# Patient Record
Sex: FEMALE | Race: OTHER | HISPANIC OR LATINO
[De-identification: names, ages, dates, MRNs, and addresses within clinical notes are randomized per-mention and may not be internally consistent; named-entity substitution may affect disease eponyms.]

---

## 2015-05-20 RX ORDER — METOPROLOL TARTRATE 50 MG
1 TABLET ORAL
Qty: 0 | Refills: 0 | DISCHARGE
Start: 2015-05-20

## 2017-03-05 ENCOUNTER — RX RENEWAL (OUTPATIENT)
Age: 42
End: 2017-03-05

## 2017-04-28 ENCOUNTER — LABORATORY RESULT (OUTPATIENT)
Age: 42
End: 2017-04-28

## 2017-04-28 ENCOUNTER — OUTPATIENT (OUTPATIENT)
Dept: OUTPATIENT SERVICES | Facility: HOSPITAL | Age: 42
LOS: 1 days | End: 2017-04-28
Payer: SELF-PAY

## 2017-04-28 ENCOUNTER — APPOINTMENT (OUTPATIENT)
Dept: INTERNAL MEDICINE | Facility: CLINIC | Age: 42
End: 2017-04-28

## 2017-04-28 VITALS
BODY MASS INDEX: 39.27 KG/M2 | WEIGHT: 208 LBS | HEART RATE: 103 BPM | DIASTOLIC BLOOD PRESSURE: 90 MMHG | SYSTOLIC BLOOD PRESSURE: 140 MMHG | HEIGHT: 61 IN

## 2017-04-28 VITALS — DIASTOLIC BLOOD PRESSURE: 80 MMHG | SYSTOLIC BLOOD PRESSURE: 130 MMHG

## 2017-04-28 DIAGNOSIS — I10 ESSENTIAL (PRIMARY) HYPERTENSION: ICD-10-CM

## 2017-04-28 DIAGNOSIS — Z00.00 ENCOUNTER FOR GENERAL ADULT MEDICAL EXAMINATION WITHOUT ABNORMAL FINDINGS: ICD-10-CM

## 2017-04-28 PROCEDURE — 85027 COMPLETE CBC AUTOMATED: CPT

## 2017-04-28 PROCEDURE — G0463: CPT

## 2017-04-28 PROCEDURE — 80061 LIPID PANEL: CPT

## 2017-04-28 PROCEDURE — 84439 ASSAY OF FREE THYROXINE: CPT

## 2017-04-28 PROCEDURE — 84443 ASSAY THYROID STIM HORMONE: CPT

## 2017-04-28 PROCEDURE — 83036 HEMOGLOBIN GLYCOSYLATED A1C: CPT

## 2017-04-28 PROCEDURE — 80053 COMPREHEN METABOLIC PANEL: CPT

## 2017-04-29 LAB
ALBUMIN SERPL ELPH-MCNC: 4.3 G/DL — SIGNIFICANT CHANGE UP (ref 3.3–5)
ALP SERPL-CCNC: 80 U/L — SIGNIFICANT CHANGE UP (ref 40–120)
ALT FLD-CCNC: 28 U/L — SIGNIFICANT CHANGE UP (ref 10–45)
ANION GAP SERPL CALC-SCNC: 18 MMOL/L — HIGH (ref 5–17)
AST SERPL-CCNC: 26 U/L — SIGNIFICANT CHANGE UP (ref 10–40)
BILIRUB SERPL-MCNC: 0.2 MG/DL — SIGNIFICANT CHANGE UP (ref 0.2–1.2)
BUN SERPL-MCNC: 16 MG/DL — SIGNIFICANT CHANGE UP (ref 7–23)
CALCIUM SERPL-MCNC: 9.2 MG/DL — SIGNIFICANT CHANGE UP (ref 8.4–10.5)
CHLORIDE SERPL-SCNC: 101 MMOL/L — SIGNIFICANT CHANGE UP (ref 96–108)
CHOLEST SERPL-MCNC: 211 MG/DL — HIGH (ref 10–199)
CO2 SERPL-SCNC: 21 MMOL/L — LOW (ref 22–31)
CREAT SERPL-MCNC: 0.78 MG/DL — SIGNIFICANT CHANGE UP (ref 0.5–1.3)
GLUCOSE SERPL-MCNC: 93 MG/DL — SIGNIFICANT CHANGE UP (ref 70–99)
HBA1C BLD-MCNC: 6 % — HIGH (ref 4–5.6)
HCT VFR BLD CALC: 40.1 % — SIGNIFICANT CHANGE UP (ref 34.5–45)
HDLC SERPL-MCNC: 48 MG/DL — SIGNIFICANT CHANGE UP (ref 40–125)
HGB BLD-MCNC: 13.2 G/DL — SIGNIFICANT CHANGE UP (ref 11.5–15.5)
LIPID PNL WITH DIRECT LDL SERPL: 99 MG/DL — SIGNIFICANT CHANGE UP
MCHC RBC-ENTMCNC: 27.7 PG — SIGNIFICANT CHANGE UP (ref 27–34)
MCHC RBC-ENTMCNC: 32.9 GM/DL — SIGNIFICANT CHANGE UP (ref 32–36)
MCV RBC AUTO: 84.2 FL — SIGNIFICANT CHANGE UP (ref 80–100)
PLATELET # BLD AUTO: 286 K/UL — SIGNIFICANT CHANGE UP (ref 150–400)
POTASSIUM SERPL-MCNC: 4 MMOL/L — SIGNIFICANT CHANGE UP (ref 3.5–5.3)
POTASSIUM SERPL-SCNC: 4 MMOL/L — SIGNIFICANT CHANGE UP (ref 3.5–5.3)
PROT SERPL-MCNC: 7.9 G/DL — SIGNIFICANT CHANGE UP (ref 6–8.3)
RBC # BLD: 4.76 M/UL — SIGNIFICANT CHANGE UP (ref 3.8–5.2)
RBC # FLD: 13.7 % — SIGNIFICANT CHANGE UP (ref 10.3–14.5)
SODIUM SERPL-SCNC: 140 MMOL/L — SIGNIFICANT CHANGE UP (ref 135–145)
TOTAL CHOLESTEROL/HDL RATIO MEASUREMENT: 4.4 RATIO — SIGNIFICANT CHANGE UP (ref 3.3–7.1)
TRIGL SERPL-MCNC: 319 MG/DL — HIGH (ref 10–149)
TSH SERPL-MCNC: 4.52 UIU/ML — HIGH (ref 0.27–4.2)
WBC # BLD: 9.99 K/UL — SIGNIFICANT CHANGE UP (ref 3.8–10.5)
WBC # FLD AUTO: 9.99 K/UL — SIGNIFICANT CHANGE UP (ref 3.8–10.5)

## 2017-05-02 LAB — T4 FREE SERPL-MCNC: 1.1 NG/DL — SIGNIFICANT CHANGE UP (ref 0.9–1.8)

## 2017-05-09 DIAGNOSIS — E66.01 MORBID (SEVERE) OBESITY DUE TO EXCESS CALORIES: ICD-10-CM

## 2017-07-12 ENCOUNTER — RX RENEWAL (OUTPATIENT)
Age: 42
End: 2017-07-12

## 2017-10-06 ENCOUNTER — MEDICATION RENEWAL (OUTPATIENT)
Age: 42
End: 2017-10-06

## 2017-10-06 ENCOUNTER — APPOINTMENT (OUTPATIENT)
Dept: PEDIATRIC ALLERGY IMMUNOLOGY | Facility: CLINIC | Age: 42
End: 2017-10-06

## 2017-10-09 ENCOUNTER — APPOINTMENT (OUTPATIENT)
Dept: INTERNAL MEDICINE | Facility: CLINIC | Age: 42
End: 2017-10-09

## 2018-02-28 ENCOUNTER — RX RENEWAL (OUTPATIENT)
Age: 43
End: 2018-02-28

## 2018-06-25 ENCOUNTER — APPOINTMENT (OUTPATIENT)
Dept: INTERNAL MEDICINE | Facility: CLINIC | Age: 43
End: 2018-06-25

## 2018-07-05 ENCOUNTER — RX RENEWAL (OUTPATIENT)
Age: 43
End: 2018-07-05

## 2018-07-30 ENCOUNTER — OUTPATIENT (OUTPATIENT)
Dept: OUTPATIENT SERVICES | Facility: HOSPITAL | Age: 43
LOS: 1 days | End: 2018-07-30
Payer: SELF-PAY

## 2018-07-30 ENCOUNTER — APPOINTMENT (OUTPATIENT)
Dept: INTERNAL MEDICINE | Facility: CLINIC | Age: 43
End: 2018-07-30

## 2018-07-30 VITALS
HEART RATE: 106 BPM | DIASTOLIC BLOOD PRESSURE: 100 MMHG | HEIGHT: 61 IN | WEIGHT: 211 LBS | BODY MASS INDEX: 39.84 KG/M2 | SYSTOLIC BLOOD PRESSURE: 160 MMHG

## 2018-07-30 DIAGNOSIS — I10 ESSENTIAL (PRIMARY) HYPERTENSION: ICD-10-CM

## 2018-07-30 PROCEDURE — G0463: CPT

## 2018-07-30 NOTE — REVIEW OF SYSTEMS
[Fever] : no fever [Chills] : no chills [Chest Pain] : no chest pain [Palpitations] : no palpitations [Orthopnea] : no orthopnea [Shortness Of Breath] : no shortness of breath [Wheezing] : no wheezing [Cough] : no cough [Abdominal Pain] : no abdominal pain [Nausea] : no nausea [Dysuria] : no dysuria [Incontinence] : no incontinence [Frequency] : no frequency [Headache] : no headache [Dizziness] : no dizziness

## 2018-07-30 NOTE — PHYSICAL EXAM
[No Acute Distress] : no acute distress [Well Developed] : well developed [Well-Appearing] : well-appearing [No Respiratory Distress] : no respiratory distress  [Clear to Auscultation] : lungs were clear to auscultation bilaterally [No Accessory Muscle Use] : no accessory muscle use [Regular Rhythm] : with a regular rhythm [Normal S1, S2] : normal S1 and S2 [No Murmur] : no murmur heard [Soft] : abdomen soft [Non Tender] : non-tender [Normal Bowel Sounds] : normal bowel sounds [No Focal Deficits] : no focal deficits

## 2018-07-30 NOTE — ASSESSMENT
[FreeTextEntry1] : 44 yo F with pmhx of poorly controlled HTN presents for renewal of HTN medications \par \par Plan:\par #Poorly controlled HTN \par -/100; currently not on medications \par -Meds renewed. \par -Scheduled for CPE

## 2018-07-30 NOTE — HISTORY OF PRESENT ILLNESS
[FreeTextEntry1] : I ran out of blood pressure medications  [de-identified] : 44 yo F with Pmhx of pre-DM and poorly controlled HTN presents to the clinic for renewal of HTN medications. The pt reports that she ran out of her meds two weeks ago. However, she is feeling well with no acute complaints. She endorses intermittent numbness in her arms that resolves spontaneously. She denies any fever, chills, nausea, vomiting, CP, palpitations, SOB, abdominal pain, changes in BM or urinary habits, or LE edema. \par

## 2018-08-02 DIAGNOSIS — R20.2 PARESTHESIA OF SKIN: ICD-10-CM

## 2018-08-06 ENCOUNTER — APPOINTMENT (OUTPATIENT)
Dept: INTERNAL MEDICINE | Facility: CLINIC | Age: 43
End: 2018-08-06

## 2018-08-06 ENCOUNTER — OUTPATIENT (OUTPATIENT)
Dept: OUTPATIENT SERVICES | Facility: HOSPITAL | Age: 43
LOS: 1 days | End: 2018-08-06
Payer: SELF-PAY

## 2018-08-06 VITALS
BODY MASS INDEX: 40.06 KG/M2 | RESPIRATION RATE: 98 BRPM | HEART RATE: 68 BPM | SYSTOLIC BLOOD PRESSURE: 130 MMHG | WEIGHT: 212 LBS | DIASTOLIC BLOOD PRESSURE: 90 MMHG

## 2018-08-06 DIAGNOSIS — I10 ESSENTIAL (PRIMARY) HYPERTENSION: ICD-10-CM

## 2018-08-06 PROCEDURE — G0463: CPT

## 2018-08-06 NOTE — PHYSICAL EXAM
[No Acute Distress] : no acute distress [PERRL] : pupils equal round and reactive to light [EOMI] : extraocular movements intact [Normal Oropharynx] : the oropharynx was normal [Normal TMs] : both tympanic membranes were normal [No Lymphadenopathy] : no lymphadenopathy [Clear to Auscultation] : lungs were clear to auscultation bilaterally [No Accessory Muscle Use] : no accessory muscle use [Normal Rate] : normal rate  [Regular Rhythm] : with a regular rhythm [Normal S1, S2] : normal S1 and S2 [No Murmur] : no murmur heard [No Edema] : there was no peripheral edema [Normal Appearance] : normal in appearance [No Masses] : no palpable masses [No Nipple Discharge] : no nipple discharge [No Axillary Lymphadenopathy] : no axillary lymphadenopathy [Soft] : abdomen soft [Non Tender] : non-tender [No HSM] : no HSM [Normal Bowel Sounds] : normal bowel sounds [Normal Posterior Cervical Nodes] : no posterior cervical lymphadenopathy [Normal Anterior Cervical Nodes] : no anterior cervical lymphadenopathy [Normal Affect] : the affect was normal [Alert and Oriented x3] : oriented to person, place, and time

## 2018-08-08 LAB
ANION GAP SERPL CALC-SCNC: 12 MMOL/L
BASOPHILS # BLD AUTO: 0.02 K/UL
BASOPHILS NFR BLD AUTO: 0.2 %
BUN SERPL-MCNC: 13 MG/DL
CALCIUM SERPL-MCNC: 9.7 MG/DL
CHLORIDE SERPL-SCNC: 103 MMOL/L
CO2 SERPL-SCNC: 25 MMOL/L
CREAT SERPL-MCNC: 0.75 MG/DL
EOSINOPHIL # BLD AUTO: 0.23 K/UL
EOSINOPHIL NFR BLD AUTO: 2.2 %
GLUCOSE SERPL-MCNC: 104 MG/DL
HBA1C MFR BLD HPLC: 6.1 %
HCT VFR BLD CALC: 39.1 %
HGB BLD-MCNC: 12.6 G/DL
IMM GRANULOCYTES NFR BLD AUTO: 0.6 %
LYMPHOCYTES # BLD AUTO: 3.17 K/UL
LYMPHOCYTES NFR BLD AUTO: 29.8 %
MAN DIFF?: NORMAL
MCHC RBC-ENTMCNC: 26.9 PG
MCHC RBC-ENTMCNC: 32.2 GM/DL
MCV RBC AUTO: 83.5 FL
MONOCYTES # BLD AUTO: 0.62 K/UL
MONOCYTES NFR BLD AUTO: 5.8 %
NEUTROPHILS # BLD AUTO: 6.53 K/UL
NEUTROPHILS NFR BLD AUTO: 61.4 %
PLATELET # BLD AUTO: 310 K/UL
POTASSIUM SERPL-SCNC: 4.8 MMOL/L
RBC # BLD: 4.68 M/UL
RBC # FLD: 14 %
SODIUM SERPL-SCNC: 140 MMOL/L
TSH SERPL-ACNC: 3.86 UIU/ML
WBC # FLD AUTO: 10.63 K/UL

## 2018-08-16 NOTE — REVIEW OF SYSTEMS
[Fever] : no fever [Chills] : no chills [Fatigue] : no fatigue [Vision Problems] : no vision problems [Chest Pain] : no chest pain [Palpitations] : no palpitations [Shortness Of Breath] : no shortness of breath [Abdominal Pain] : no abdominal pain [Nausea] : no nausea [Constipation] : no constipation [Vomiting] : no vomiting [Melena] : no melena [Dysuria] : no dysuria [Joint Pain] : no joint pain [Skin Rash] : no skin rash [Depression] : no depression

## 2018-08-16 NOTE — ASSESSMENT
[FreeTextEntry1] : Patient is a 44 y/o F with a PMH significant for HTN who presents today for a blood pressure check and CPE.\par \par 1) HCM\par - mammogram referral given\par - due for PAP in 2019\par - CBC, CMP, A1c, TSH, and liid panel ordered\par \par \par 2) Morbid Obesity\par - educated extensively on the importance of lifestyle modifications\par - recommended walking 30 minutes 3 times a week to start for exercise\par - counseled on the importance of minimizing carbohydrate intake \par - would like to try to lose weight on her own before being referred to obesity clinic\par \par \par 3) HTN\par - blood pressure better controlled today\par - continue lisinopril and metoprolol \par \par \par RTC as needed or in 1 year for CPE

## 2018-08-17 DIAGNOSIS — E66.01 MORBID (SEVERE) OBESITY DUE TO EXCESS CALORIES: ICD-10-CM

## 2018-08-17 DIAGNOSIS — E78.5 HYPERLIPIDEMIA, UNSPECIFIED: ICD-10-CM

## 2018-08-19 ENCOUNTER — FORM ENCOUNTER (OUTPATIENT)
Age: 43
End: 2018-08-19

## 2018-08-20 ENCOUNTER — APPOINTMENT (OUTPATIENT)
Dept: MAMMOGRAPHY | Facility: IMAGING CENTER | Age: 43
End: 2018-08-20
Payer: COMMERCIAL

## 2018-08-20 ENCOUNTER — OUTPATIENT (OUTPATIENT)
Dept: OUTPATIENT SERVICES | Facility: HOSPITAL | Age: 43
LOS: 1 days | End: 2018-08-20
Payer: SELF-PAY

## 2018-08-20 DIAGNOSIS — E66.01 MORBID (SEVERE) OBESITY DUE TO EXCESS CALORIES: ICD-10-CM

## 2018-08-20 DIAGNOSIS — E78.5 HYPERLIPIDEMIA, UNSPECIFIED: ICD-10-CM

## 2018-08-20 PROCEDURE — 77067 SCR MAMMO BI INCL CAD: CPT | Mod: 26

## 2018-08-20 PROCEDURE — 77067 SCR MAMMO BI INCL CAD: CPT

## 2018-08-20 PROCEDURE — 77063 BREAST TOMOSYNTHESIS BI: CPT | Mod: 26

## 2018-08-20 PROCEDURE — 77063 BREAST TOMOSYNTHESIS BI: CPT

## 2018-10-05 ENCOUNTER — RX RENEWAL (OUTPATIENT)
Age: 43
End: 2018-10-05

## 2018-12-28 ENCOUNTER — EMERGENCY (EMERGENCY)
Facility: HOSPITAL | Age: 43
LOS: 1 days | End: 2018-12-28
Attending: EMERGENCY MEDICINE
Payer: MEDICAID

## 2018-12-28 VITALS
TEMPERATURE: 100 F | DIASTOLIC BLOOD PRESSURE: 102 MMHG | HEART RATE: 140 BPM | OXYGEN SATURATION: 97 % | RESPIRATION RATE: 20 BRPM | SYSTOLIC BLOOD PRESSURE: 159 MMHG

## 2018-12-28 LAB
ALBUMIN SERPL ELPH-MCNC: 4.4 G/DL — SIGNIFICANT CHANGE UP (ref 3.3–5)
ALP SERPL-CCNC: 128 U/L — HIGH (ref 40–120)
ALT FLD-CCNC: 20 U/L — SIGNIFICANT CHANGE UP (ref 10–45)
ANION GAP SERPL CALC-SCNC: 17 MMOL/L — SIGNIFICANT CHANGE UP (ref 5–17)
APPEARANCE UR: CLEAR — SIGNIFICANT CHANGE UP
AST SERPL-CCNC: 21 U/L — SIGNIFICANT CHANGE UP (ref 10–40)
BASOPHILS # BLD AUTO: 0 K/UL — SIGNIFICANT CHANGE UP (ref 0–0.2)
BASOPHILS NFR BLD AUTO: 0.1 % — SIGNIFICANT CHANGE UP (ref 0–2)
BILIRUB SERPL-MCNC: 0.3 MG/DL — SIGNIFICANT CHANGE UP (ref 0.2–1.2)
BILIRUB UR-MCNC: NEGATIVE — SIGNIFICANT CHANGE UP
BUN SERPL-MCNC: 16 MG/DL — SIGNIFICANT CHANGE UP (ref 7–23)
CALCIUM SERPL-MCNC: 8.9 MG/DL — SIGNIFICANT CHANGE UP (ref 8.4–10.5)
CHLORIDE SERPL-SCNC: 100 MMOL/L — SIGNIFICANT CHANGE UP (ref 96–108)
CO2 SERPL-SCNC: 18 MMOL/L — LOW (ref 22–31)
COLOR SPEC: SIGNIFICANT CHANGE UP
CREAT SERPL-MCNC: 0.69 MG/DL — SIGNIFICANT CHANGE UP (ref 0.5–1.3)
DIFF PNL FLD: NEGATIVE — SIGNIFICANT CHANGE UP
EOSINOPHIL # BLD AUTO: 0 K/UL — SIGNIFICANT CHANGE UP (ref 0–0.5)
EOSINOPHIL NFR BLD AUTO: 0.3 % — SIGNIFICANT CHANGE UP (ref 0–6)
GAS PNL BLDV: SIGNIFICANT CHANGE UP
GLUCOSE SERPL-MCNC: 102 MG/DL — HIGH (ref 70–99)
GLUCOSE UR QL: NEGATIVE — SIGNIFICANT CHANGE UP
HCG SERPL-ACNC: <2 MIU/ML — SIGNIFICANT CHANGE UP
HCT VFR BLD CALC: 35.8 % — SIGNIFICANT CHANGE UP (ref 34.5–45)
HGB BLD-MCNC: 10.6 G/DL — LOW (ref 11.5–15.5)
INR BLD: 0.97 RATIO — SIGNIFICANT CHANGE UP (ref 0.88–1.16)
KETONES UR-MCNC: NEGATIVE — SIGNIFICANT CHANGE UP
LEUKOCYTE ESTERASE UR-ACNC: ABNORMAL
LYMPHOCYTES # BLD AUTO: 1.5 K/UL — SIGNIFICANT CHANGE UP (ref 1–3.3)
LYMPHOCYTES # BLD AUTO: 9.9 % — LOW (ref 13–44)
MCHC RBC-ENTMCNC: 23.3 PG — LOW (ref 27–34)
MCHC RBC-ENTMCNC: 29.5 GM/DL — LOW (ref 32–36)
MCV RBC AUTO: 78.8 FL — LOW (ref 80–100)
MONOCYTES # BLD AUTO: 0.7 K/UL — SIGNIFICANT CHANGE UP (ref 0–0.9)
MONOCYTES NFR BLD AUTO: 4.8 % — SIGNIFICANT CHANGE UP (ref 2–14)
NEUTROPHILS # BLD AUTO: 12.8 K/UL — HIGH (ref 1.8–7.4)
NEUTROPHILS NFR BLD AUTO: 84.8 % — HIGH (ref 43–77)
NITRITE UR-MCNC: NEGATIVE — SIGNIFICANT CHANGE UP
PH UR: 6.5 — SIGNIFICANT CHANGE UP (ref 5–8)
PLATELET # BLD AUTO: 256 K/UL — SIGNIFICANT CHANGE UP (ref 150–400)
POTASSIUM SERPL-MCNC: 4.5 MMOL/L — SIGNIFICANT CHANGE UP (ref 3.5–5.3)
POTASSIUM SERPL-SCNC: 4.5 MMOL/L — SIGNIFICANT CHANGE UP (ref 3.5–5.3)
PROT SERPL-MCNC: 7.7 G/DL — SIGNIFICANT CHANGE UP (ref 6–8.3)
PROT UR-MCNC: ABNORMAL
PROTHROM AB SERPL-ACNC: 11.2 SEC — SIGNIFICANT CHANGE UP (ref 10–12.9)
RBC # BLD: 4.54 M/UL — SIGNIFICANT CHANGE UP (ref 3.8–5.2)
RBC # FLD: 13.5 % — SIGNIFICANT CHANGE UP (ref 10.3–14.5)
SODIUM SERPL-SCNC: 135 MMOL/L — SIGNIFICANT CHANGE UP (ref 135–145)
SP GR SPEC: 1.03 — HIGH (ref 1.01–1.02)
UROBILINOGEN FLD QL: NEGATIVE — SIGNIFICANT CHANGE UP
WBC # BLD: 15.1 K/UL — HIGH (ref 3.8–10.5)
WBC # FLD AUTO: 15.1 K/UL — HIGH (ref 3.8–10.5)

## 2018-12-28 PROCEDURE — 74177 CT ABD & PELVIS W/CONTRAST: CPT | Mod: 26

## 2018-12-28 PROCEDURE — 71045 X-RAY EXAM CHEST 1 VIEW: CPT | Mod: 26

## 2018-12-28 PROCEDURE — 99244 OFF/OP CNSLTJ NEW/EST MOD 40: CPT | Mod: GC

## 2018-12-28 PROCEDURE — 93010 ELECTROCARDIOGRAM REPORT: CPT

## 2018-12-28 PROCEDURE — 99284 EMERGENCY DEPT VISIT MOD MDM: CPT | Mod: 25

## 2018-12-28 RX ORDER — ACETAMINOPHEN 500 MG
975 TABLET ORAL ONCE
Qty: 0 | Refills: 0 | Status: COMPLETED | OUTPATIENT
Start: 2018-12-28 | End: 2018-12-28

## 2018-12-28 RX ORDER — CEFTRIAXONE 500 MG/1
1 INJECTION, POWDER, FOR SOLUTION INTRAMUSCULAR; INTRAVENOUS ONCE
Qty: 0 | Refills: 0 | Status: COMPLETED | OUTPATIENT
Start: 2018-12-28 | End: 2018-12-28

## 2018-12-28 RX ORDER — SODIUM CHLORIDE 9 MG/ML
2000 INJECTION INTRAMUSCULAR; INTRAVENOUS; SUBCUTANEOUS ONCE
Qty: 0 | Refills: 0 | Status: COMPLETED | OUTPATIENT
Start: 2018-12-28 | End: 2018-12-28

## 2018-12-28 RX ADMIN — SODIUM CHLORIDE 2000 MILLILITER(S): 9 INJECTION INTRAMUSCULAR; INTRAVENOUS; SUBCUTANEOUS at 20:55

## 2018-12-28 RX ADMIN — Medication 975 MILLIGRAM(S): at 20:55

## 2018-12-28 RX ADMIN — CEFTRIAXONE 100 GRAM(S): 500 INJECTION, POWDER, FOR SOLUTION INTRAMUSCULAR; INTRAVENOUS at 20:55

## 2018-12-28 NOTE — ED PROVIDER NOTE - OBJECTIVE STATEMENT
44yo F pmhx HTN p/w CC abdominal pain and fever x1 day. States symptoms started around noon, accompanied by nausea, diarrhea, but no vomiting. Pt. also endorses 2 weeks of discomfort with urination. She last took tylenol 4 hours ago for fever. She denies HA cp sob cough vomiting. 42yo F pmhx HTN p/w CC abdominal pain and fever x1 day. States symptoms started around noon, accompanied by nausea, diarrhea, but no vomiting. Pt. also endorses 2 weeks of discomfort with urination. She last took tylenol 4 hours ago for fever. She denies HA cp sob cough vomiting.      Attn - pt seen in hallway by Rm#[id number] - agree with above - pt c/o fever, abdo pain and nausea and diarrhea for one day.  pt reports dysuria, but no hematuria.  no radiation.  NO: resp sympt, vomiting. distention.

## 2018-12-28 NOTE — ED ADULT NURSE NOTE - OBJECTIVE STATEMENT
42 y/o female presents to ed co N/V/D that began at 12 noon. Hx of HTN. Also complains of feeling light headed, ABD pain with SOB when the pain occurs. Decreased PO intake today. Burning during urination for two weeks . Denies chest pain, ha, hematuria. A&Ox4, tachycardic and febrile, skin warm dry and intact, MAEx4, lungs CTA, abd soft obese and tender. Patient's bed in the lowest position, explained plan of care to patient and family members. Will continue to reassess.

## 2018-12-28 NOTE — ED PROVIDER NOTE - MEDICAL DECISION MAKING DETAILS
42yo F pmhx htn p/w CC abd pain and fever, has LLQ tenderness, will send labs, CT abd/pelv, send UA. Pt. meets sepsis criteria will cover w/ abx and give fluids. 42yo F pmhx htn p/w CC abd pain and fever, has LLQ tenderness, will send labs, CT abd/pelv, send UA. Pt. meets sepsis criteria will cover w/ abx and give fluids.      Attn- pt with fever, n/d, abdo pain  and recent dysuria - r/o Pyelo, divertic.   labs, UCG, UA, CT abdo

## 2018-12-28 NOTE — ED PROVIDER NOTE - PROGRESS NOTE DETAILS
Guszack: Left ovarian cyst vs hydrosalpinx on CT. Getting TVUS to evaluate further. Blas: EValuated by gyn due to persistent pain and fever in the setting of ovarian cyst, no gyn pathology. Will obs in CDU for treatment of pyelonephritis.

## 2018-12-28 NOTE — ED PROVIDER NOTE - PHYSICAL EXAMINATION
Attn- alert, nad, tachycardia. no pallor or jaundice, lungs-, cor - tachycardia, abdo - soft, tender LLQ, no guarding or rebound, no CVAT,  Ext-, neuro-,

## 2018-12-28 NOTE — ED PROVIDER NOTE - SHIFT CHANGE DETAILS
Attending MD Fortune: 43F fever, abdominal pain, cramping in LLQ, ?viral syndrome ?diverticulitis, pending CT A/P.  Possible discharge if findings can be treated as outpatient.

## 2018-12-29 VITALS
HEART RATE: 91 BPM | OXYGEN SATURATION: 95 % | RESPIRATION RATE: 18 BRPM | TEMPERATURE: 99 F | DIASTOLIC BLOOD PRESSURE: 78 MMHG | SYSTOLIC BLOOD PRESSURE: 121 MMHG

## 2018-12-29 DIAGNOSIS — R10.9 UNSPECIFIED ABDOMINAL PAIN: ICD-10-CM

## 2018-12-29 LAB — RAPID RVP RESULT: SIGNIFICANT CHANGE UP

## 2018-12-29 PROCEDURE — 87040 BLOOD CULTURE FOR BACTERIA: CPT

## 2018-12-29 PROCEDURE — 82947 ASSAY GLUCOSE BLOOD QUANT: CPT

## 2018-12-29 PROCEDURE — 82435 ASSAY OF BLOOD CHLORIDE: CPT

## 2018-12-29 PROCEDURE — 93005 ELECTROCARDIOGRAM TRACING: CPT

## 2018-12-29 PROCEDURE — 93975 VASCULAR STUDY: CPT

## 2018-12-29 PROCEDURE — 96366 THER/PROPH/DIAG IV INF ADDON: CPT

## 2018-12-29 PROCEDURE — 82330 ASSAY OF CALCIUM: CPT

## 2018-12-29 PROCEDURE — 81001 URINALYSIS AUTO W/SCOPE: CPT

## 2018-12-29 PROCEDURE — 87581 M.PNEUMON DNA AMP PROBE: CPT

## 2018-12-29 PROCEDURE — 87486 CHLMYD PNEUM DNA AMP PROBE: CPT

## 2018-12-29 PROCEDURE — 87086 URINE CULTURE/COLONY COUNT: CPT

## 2018-12-29 PROCEDURE — 84132 ASSAY OF SERUM POTASSIUM: CPT

## 2018-12-29 PROCEDURE — 96376 TX/PRO/DX INJ SAME DRUG ADON: CPT

## 2018-12-29 PROCEDURE — 85027 COMPLETE CBC AUTOMATED: CPT

## 2018-12-29 PROCEDURE — 84702 CHORIONIC GONADOTROPIN TEST: CPT

## 2018-12-29 PROCEDURE — 74177 CT ABD & PELVIS W/CONTRAST: CPT

## 2018-12-29 PROCEDURE — 76830 TRANSVAGINAL US NON-OB: CPT | Mod: 26

## 2018-12-29 PROCEDURE — 83605 ASSAY OF LACTIC ACID: CPT

## 2018-12-29 PROCEDURE — 71045 X-RAY EXAM CHEST 1 VIEW: CPT

## 2018-12-29 PROCEDURE — 87186 SC STD MICRODIL/AGAR DIL: CPT

## 2018-12-29 PROCEDURE — 87798 DETECT AGENT NOS DNA AMP: CPT

## 2018-12-29 PROCEDURE — 85610 PROTHROMBIN TIME: CPT

## 2018-12-29 PROCEDURE — 99236 HOSP IP/OBS SAME DATE HI 85: CPT

## 2018-12-29 PROCEDURE — 76830 TRANSVAGINAL US NON-OB: CPT

## 2018-12-29 PROCEDURE — 80053 COMPREHEN METABOLIC PANEL: CPT

## 2018-12-29 PROCEDURE — 84295 ASSAY OF SERUM SODIUM: CPT

## 2018-12-29 PROCEDURE — 93975 VASCULAR STUDY: CPT | Mod: 26

## 2018-12-29 PROCEDURE — G0378: CPT

## 2018-12-29 PROCEDURE — 85014 HEMATOCRIT: CPT

## 2018-12-29 PROCEDURE — 82803 BLOOD GASES ANY COMBINATION: CPT

## 2018-12-29 PROCEDURE — 87633 RESP VIRUS 12-25 TARGETS: CPT

## 2018-12-29 PROCEDURE — 96365 THER/PROPH/DIAG IV INF INIT: CPT | Mod: XU

## 2018-12-29 PROCEDURE — 99284 EMERGENCY DEPT VISIT MOD MDM: CPT | Mod: 25

## 2018-12-29 RX ORDER — ONDANSETRON 8 MG/1
1 TABLET, FILM COATED ORAL
Qty: 9 | Refills: 0
Start: 2018-12-29 | End: 2018-12-31

## 2018-12-29 RX ORDER — SODIUM CHLORIDE 9 MG/ML
1000 INJECTION INTRAMUSCULAR; INTRAVENOUS; SUBCUTANEOUS
Qty: 0 | Refills: 0 | Status: DISCONTINUED | OUTPATIENT
Start: 2018-12-29 | End: 2019-01-01

## 2018-12-29 RX ORDER — ONDANSETRON 8 MG/1
4 TABLET, FILM COATED ORAL ONCE
Qty: 0 | Refills: 0 | Status: COMPLETED | OUTPATIENT
Start: 2018-12-29 | End: 2018-12-29

## 2018-12-29 RX ORDER — CEPHALEXIN 500 MG
1 CAPSULE ORAL
Qty: 30 | Refills: 0
Start: 2018-12-29 | End: 2019-01-07

## 2018-12-29 RX ORDER — CEFTRIAXONE 500 MG/1
1 INJECTION, POWDER, FOR SOLUTION INTRAMUSCULAR; INTRAVENOUS EVERY 12 HOURS
Qty: 0 | Refills: 0 | Status: DISCONTINUED | OUTPATIENT
Start: 2018-12-29 | End: 2019-01-01

## 2018-12-29 RX ORDER — ACETAMINOPHEN 500 MG
975 TABLET ORAL ONCE
Qty: 0 | Refills: 0 | Status: COMPLETED | OUTPATIENT
Start: 2018-12-29 | End: 2018-12-29

## 2018-12-29 RX ADMIN — SODIUM CHLORIDE 125 MILLILITER(S): 9 INJECTION INTRAMUSCULAR; INTRAVENOUS; SUBCUTANEOUS at 14:27

## 2018-12-29 RX ADMIN — ONDANSETRON 4 MILLIGRAM(S): 8 TABLET, FILM COATED ORAL at 19:53

## 2018-12-29 RX ADMIN — Medication 975 MILLIGRAM(S): at 03:11

## 2018-12-29 RX ADMIN — CEFTRIAXONE 100 GRAM(S): 500 INJECTION, POWDER, FOR SOLUTION INTRAMUSCULAR; INTRAVENOUS at 07:51

## 2018-12-29 RX ADMIN — CEFTRIAXONE 100 GRAM(S): 500 INJECTION, POWDER, FOR SOLUTION INTRAMUSCULAR; INTRAVENOUS at 19:49

## 2018-12-29 RX ADMIN — CEFTRIAXONE 1 GRAM(S): 500 INJECTION, POWDER, FOR SOLUTION INTRAMUSCULAR; INTRAVENOUS at 03:11

## 2018-12-29 RX ADMIN — ONDANSETRON 4 MILLIGRAM(S): 8 TABLET, FILM COATED ORAL at 08:53

## 2018-12-29 RX ADMIN — SODIUM CHLORIDE 2000 MILLILITER(S): 9 INJECTION INTRAMUSCULAR; INTRAVENOUS; SUBCUTANEOUS at 03:11

## 2018-12-29 RX ADMIN — SODIUM CHLORIDE 125 MILLILITER(S): 9 INJECTION INTRAMUSCULAR; INTRAVENOUS; SUBCUTANEOUS at 06:07

## 2018-12-29 NOTE — CONSULT NOTE ADULT - PROBLEM SELECTOR RECOMMENDATION 9
-no acute gyn intervention   -Care per ED  -Pt to continue routine gyn care at [address] [place] [address]    D/w Dr. Keely De Paz, pgy2

## 2018-12-29 NOTE — CONSULT NOTE ADULT - SUBJECTIVE AND OBJECTIVE BOX
R2 GYN ED CONSULT NOTE     phone: #[phone]  SUBJECTIVE:    44yo  (LMP=18) presenting with one day of abdominal pain, nausea, diarrhea, myalgias, and fevers. Pt states that she was in her usual states of health when she started to have watery diarrhea yesterday. Later in the day she developed a high fever at home (101), myalgias, and abdominal pain that was worse with a BM. She states she is having a BM every 30 mins.     Pt is sexually active with her  for many years. No hx of STIs. She had a Bilateral Tubal Ligation in .     Pt denies , headache, constipation, dizzyness, syncope, chest pain, palpitations, shortness of breath, dysuria, urgency, frequency, , vaginal bleeding/discharge/odor/pain/itching,     Primary OB/GYN: [address] [place] [address]  OBH:  X6  GYNH: denies hx of fibroids, ov cysts, abnl paps, sti. s/p Bilateral Tubal Ligation ()  PMSH: denied  MEDS: none  ALL: nkda  SOCH: denies t/e/d; safe at home  FAMH: denies fam hx of breast/uterine/ovarian/colon cancer  ROS: negative except per hpi    OBJECTIVE:    VITAL SIGNS:  Vital Signs Last 24 Hrs  T(C): 38.4 (29 Dec 2018 02:36), Max: 38.4 (29 Dec 2018 02:36)  T(F): 101.1 (29 Dec 2018 02:36), Max: 101.1 (29 Dec 2018 02:36)  HR: 127 (29 Dec 2018 02:36) (121 - 140)  BP: 136/80 (29 Dec 2018 02:36) (127/73 - 159/102)  BP(mean): --  RR: 18 (29 Dec 2018 02:36) (16 - 20)  SpO2: 98% (29 Dec 2018 02:36) (97% - 98%)    CAPILLARY BLOOD GLUCOSE            PHYSICAL EXAM:  GEN: NAD, A&Ox3  CV: RRR, no m/g/r  LUNGS: CTAB  ABD: soft, diffusely tender, no rebound or guarding  SPECULUM: No abn discharge.  closed os. No blood in vaginal vault  PELVIC: No CMT. No adnexal tenderness.   EXT: WWP, no edema/TTP    LABS:                        10.6   15.1  )-----------( 256      ( 28 Dec 2018 20:48 )             35.8   baso 0.1    eos 0.3    imm gran x      lymph 9.9    mono 4.8    poly 84.8           135  |  100  |  16  ----------------------------<  102<H>  4.5   |  18<L>  |  0.69    Ca    8.9      28 Dec 2018 20:48    TPro  7.7  /  Alb  4.4  /  TBili  0.3  /  DBili  x   /  AST  21  /  ALT  20  /  AlkPhos  128<H>        Urinalysis Basic - ( 28 Dec 2018 20:52 )    Color: Light Yellow / Appearance: Clear / S.026 / pH: x  Gluc: x / Ketone: Negative  / Bili: Negative / Urobili: Negative   Blood: x / Protein: Trace / Nitrite: Negative   Leuk Esterase: Moderate / RBC: 4 /hpf / WBC 11 /hpf   Sq Epi: x / Non Sq Epi: 10 /hpf / Bacteria: Moderate      < from: US Doppler Pelvis (18 @ 02:41) >    EXAM:  US TRANSVAGINAL                          EXAM:  US DPLX PELVIC                            PROCEDURE DATE:  2018            INTERPRETATION:  CLINICAL INFORMATION: Left lower quadrant pain, evaluate   for ovarian cyst. Negative serum beta hCG from 2018.    LMP: 2018    COMPARISON: CT abdomen pelvis from 2018.    TECHNIQUE:   Endovaginal and transabdominal pelvic sonogram. Color and Spectral   Doppler was performed.    FINDINGS:    Uterus: 8.6 x 4.7 x 4.9 cm. Small nabothian cysts.    Endometrium: 8 mm.     Right ovary: 4.2 x 2.7 x 3.8 cm. Simple cyst measuring 3.1 x 2.5 x 2.8   cm. Preserved flow    Left ovary: 3.3 x 3.4 x 2.0 cm. 2 cysts: a simple cyst measures 1.8 x 1.8   x 1.9 cm, and a hemorrhagic cyst with internal echogenicity measures 1.5   x 1.6 x 1.3 cm. Preserved flow. No suggestion of a hydrosalpinx    Fluid: None.    IMPRESSION:    Bilateral ovarian cysts, including a hemorrhagic left ovarian cyst which   measures up to 1.6 cm.                 MAYRA BARRAZA M.D., RADIOLOGY RESIDENT  This document has been electronically signed.  BONILLA PAINTING M.D., ATTENDING RADIOLOGIST  This document has been electronically signed. Dec 29 2018  2:41AM                < end of copied text > R2 GYN ED CONSULT NOTE     phone: #[phone]  SUBJECTIVE:    44yo  (LMP=18) presenting with one day of abdominal pain, nausea, diarrhea, myalgias, and fevers. Pt states that she was in her usual states of health when she started to have watery diarrhea yesterday. Later in the day she developed a high fever at home (101), myalgias, and abdominal pain that was worse with a BM. She states she is having a BM every 30 mins. She is having some dysuria.     Pt is sexually active with her  for many years. No hx of STIs. She had a Bilateral Tubal Ligation in .     Pt denies , headache, constipation, dizzyness, syncope, chest pain, palpitations, shortness of breath, dysuria, urgency, frequency, , vaginal bleeding/discharge/odor/pain/itching,     Primary OB/GYN: [facility]  OBH:  X6  GYNH: denies hx of fibroids, ov cysts, abnl paps, sti. s/p Bilateral Tubal Ligation ()  PMSH: denied  MEDS: none  ALL: nkda  SOCH: denies t/e/d; safe at home  FAMH: denies fam hx of breast/uterine/ovarian/colon cancer  ROS: negative except per hpi    OBJECTIVE:    VITAL SIGNS:  Vital Signs Last 24 Hrs  T(C): 38.4 (29 Dec 2018 02:36), Max: 38.4 (29 Dec 2018 02:36)  T(F): 101.1 (29 Dec 2018 02:36), Max: 101.1 (29 Dec 2018 02:36)  HR: 127 (29 Dec 2018 02:36) (121 - 140)  BP: 136/80 (29 Dec 2018 02:36) (127/73 - 159/102)  BP(mean): --  RR: 18 (29 Dec 2018 02:36) (16 - 20)  SpO2: 98% (29 Dec 2018 02:36) (97% - 98%)    CAPILLARY BLOOD GLUCOSE            PHYSICAL EXAM:  GEN: NAD, A&Ox3  CV: RRR, no m/g/r  LUNGS: CTAB  ABD: soft, diffusely tender, no rebound or guarding  SPECULUM: No abn discharge.  closed os. No blood in vaginal vault  PELVIC: No CMT. No adnexal tenderness.   EXT: WWP, no edema/TTP    LABS:                        10.6   15.1  )-----------( 256      ( 28 Dec 2018 20:48 )             35.8   baso 0.1    eos 0.3    imm gran x      lymph 9.9    mono 4.8    poly 84.8           135  |  100  |  16  ----------------------------<  102<H>  4.5   |  18<L>  |  0.69    Ca    8.9      28 Dec 2018 20:48    TPro  7.7  /  Alb  4.4  /  TBili  0.3  /  DBili  x   /  AST  21  /  ALT  20  /  AlkPhos  128<H>        Urinalysis Basic - ( 28 Dec 2018 20:52 )    Color: Light Yellow / Appearance: Clear / S.026 / pH: x  Gluc: x / Ketone: Negative  / Bili: Negative / Urobili: Negative   Blood: x / Protein: Trace / Nitrite: Negative   Leuk Esterase: Moderate / RBC: 4 /hpf / WBC 11 /hpf   Sq Epi: x / Non Sq Epi: 10 /hpf / Bacteria: Moderate      < from: US Doppler Pelvis (18 @ 02:41) >    EXAM:  US TRANSVAGINAL                          EXAM:  US DPLX PELVIC                            PROCEDURE DATE:  2018            INTERPRETATION:  CLINICAL INFORMATION: Left lower quadrant pain, evaluate   for ovarian cyst. Negative serum beta hCG from 2018.    LMP: 2018    COMPARISON: CT abdomen pelvis from 2018.    TECHNIQUE:   Endovaginal and transabdominal pelvic sonogram. Color and Spectral   Doppler was performed.    FINDINGS:    Uterus: 8.6 x 4.7 x 4.9 cm. Small nabothian cysts.    Endometrium: 8 mm.     Right ovary: 4.2 x 2.7 x 3.8 cm. Simple cyst measuring 3.1 x 2.5 x 2.8   cm. Preserved flow    Left ovary: 3.3 x 3.4 x 2.0 cm. 2 cysts: a simple cyst measures 1.8 x 1.8   x 1.9 cm, and a hemorrhagic cyst with internal echogenicity measures 1.5   x 1.6 x 1.3 cm. Preserved flow. No suggestion of a hydrosalpinx    Fluid: None.    IMPRESSION:    Bilateral ovarian cysts, including a hemorrhagic left ovarian cyst which   measures up to 1.6 cm.                 MAYRA BARRAZA M.D., RADIOLOGY RESIDENT  This document has been electronically signed.  BONILLA PAINTING M.D., ATTENDING RADIOLOGIST  This document has been electronically signed. Dec 29 2018  2:41AM                < end of copied text >

## 2018-12-29 NOTE — ED CDU PROVIDER DISPOSITION NOTE - CLINICAL COURSE
42yo F with pmhx HTN p/w CC of abdominal pain and fever x1 day. States symptoms started around noon, accompanied by nausea, nbnb vomiting, and diarrhea. Pt. also endorses 2 weeks of dysuria and frequency. She last took tylenol 4 hours ago for fever. She denies hematuria, flank pain, cp, sob, back pain, or other symptoms.  In ED, pt tachy and febrile, WBC 15.1, UA positive for UTI, other labs unremarkable, RVP negative, CXR negative, CT a/p showed cyst in L adnexa, TVUS showed b/l ovarian cysts. GYN c/s'd, no intervention warranted. pt started on ceftriaxone for clinical pyelo and sent to CDU for continued monitoring and antibiotics.  In CDU, ___________. 42yo F with pmhx HTN p/w CC of abdominal pain and fever x1 day. States symptoms started around noon, accompanied by nausea, nbnb vomiting, and diarrhea. Pt. also endorses 2 weeks of dysuria and frequency. She last took tylenol 4 hours ago for fever. She denies hematuria, flank pain, cp, sob, back pain, or other symptoms.  In ED, pt tachy and febrile, WBC 15.1, UA positive for UTI, other labs unremarkable, RVP negative, CXR negative, CT a/p showed cyst in L adnexa, TVUS showed b/l ovarian cysts. GYN c/s'd, no intervention warranted. pt started on ceftriaxone for clinical pyelo and sent to CDU for continued monitoring and antibiotics.  In CDU, pt gradually improved on IV abx, needed zofran for nausea control but tolerated PO breakfast, lunch, and dinner. Urinary sxs resolved and pt remained afebrile w/o antipyretics. Pt safe and stable for DC. 44yo F with pmhx HTN p/w CC of abdominal pain and fever x1 day. States symptoms started around noon, accompanied by nausea, nbnb vomiting, and diarrhea. Pt. also endorses 2 weeks of dysuria and frequency. She last took tylenol 4 hours ago for fever. She denies hematuria, flank pain, cp, sob, back pain, or other symptoms.  In ED, pt tachy and febrile, WBC 15.1, UA positive for UTI, other labs unremarkable, RVP negative, CXR negative, CT a/p showed cyst in L adnexa, TVUS showed b/l ovarian cysts. GYN c/s'd, no intervention warranted. pt started on ceftriaxone for clinical pyelo and sent to CDU for continued monitoring and antibiotics.  In CDU, pt gradually improved on IV abx, needed zofran for nausea control but tolerated PO breakfast, lunch, and dinner. Urinary sxs resolved and pt remained afebrile w/o antipyretics. Pt safe and stable for DC. case d/w Dr. Cherry.

## 2018-12-29 NOTE — CONSULT NOTE ADULT - ASSESSMENT
42yo  (LMP=18) presenting with one day of abdominal pain, nausea, diarrhea, myalgias, and fevers. Abd unlikely to be gyn in origin. No evidence of PID. TVUS shows a small 1.8cm hemorrhagic cyst. 44yo  (LMP=18) presenting with one day of abdominal pain, nausea, diarrhea, myalgias, and fevers. Abd pain unlikely to be gyn in origin. No evidence of PID at this time. TVUS shows a small 1.8cm hemorrhagic cyst. No evidence of ovarian torsion.

## 2018-12-29 NOTE — ED ADULT NURSE REASSESSMENT NOTE - NSIMPLEMENTINTERV_GEN_ALL_ED
Implemented All Universal Safety Interventions:  [place] to call system. Call bell, personal items and telephone within reach. Instruct patient to call for assistance. Room bathroom lighting operational. Non-slip footwear when patient is off stretcher. Physically safe environment: no spills, clutter or unnecessary equipment. Stretcher in lowest position, wheels locked, appropriate side rails in place.

## 2018-12-29 NOTE — ED CDU PROVIDER INITIAL DAY NOTE - ATTENDING CONTRIBUTION TO CARE
Attending MD Fortune:   I personally have seen and examined this patient.  Physician assistant note reviewed and agree on plan of care and except where noted.  See below for details.    Patient received on sign out from Dr. Ojeda, see ED provider note    43F with PMH including HTN presented ot the ED with abdominal pain and fever, nausea.  WBC 15, UA +, seen by gyn, remained tachy and in pain, sent to CDU.  On exam, NAD, tachycardic, soft, LLQ tenderness.  A/P: 43F with ovarian cysts and clinical pyelo, sent to CDU for continued abx and monitoring

## 2018-12-29 NOTE — ED ADULT NURSE REASSESSMENT NOTE - NS ED NURSE REASSESS COMMENT FT1
Pt received from MICHAEL Hernandez. Pt oriented to CDU & plan of care was discussed. Pt A&O x 4. Pt in CDU for IV abxs. Spoke with  #[phone]. Pt denies any burning on urination, or frequency. Pt states she has 5/10 abdominal pain denies any medications as of now. Pt c/o nausea, will medicate as per MAR. Safety & comfort measures maintained. Call bell in reach. Will continue to monitor.

## 2018-12-29 NOTE — ED CDU PROVIDER DISPOSITION NOTE - ATTENDING CONTRIBUTION TO CARE
I have seen and evaluated this patient with the [facility] practice clinician.   I agree with the findings  unless other wise stated. I have amended notes where needed.  After my face to face bedside evaluation, I am notinyo F with pmhx HTN p/w CC of abdominal pain and fever x1 day. States symptoms started around noon, accompanied by nausea, nbnb vomiting, and diarrhea. Pt. also endorses 2 weeks of dysuria and frequency. She last took tylenol 4 hours ago for fever. She denies hematuria, flank pain, cp, sob, back pain, or other symptoms.

## 2018-12-29 NOTE — ED ADULT NURSE REASSESSMENT NOTE - COMFORT CARE
darkened lights/plan of care explained
po fluids offered/repositioned/plan of care explained/warm blanket provided/darkened lights
plan of care explained/po fluids offered/ambulated to bathroom

## 2018-12-29 NOTE — ED ADULT NURSE REASSESSMENT NOTE - NS ED NURSE REASSESS COMMENT FT1
Received pt from MICHAEL pemberton) , received pt alert and responsive, oriented x4, denies any respiratory distress, SOB, or difficulty breathing. Pt transferred to CDU for urinary symptoms, pt is currently pain free at this time, Pt denies abdominal pain, nausea. c/o dysuria and frequency. pending IV Rocephin at 0800, pt aware.   Placed on ordered IV fluids, tolerating well. IV in place, patent and free of signs of infiltration, on continuos cardiac monitoring as ordered, sinus tachycardia low 100's.  pt denies chest pain or palpitations, V/S stable, pt afebrile, pt denies pain at this time. Pt educated on unit and unit rules, instructed patient to notify RN of any needed assistance, Pt verbalizes understanding, Call bell placed within reach. Safety maintained. Will continue to monitor. [place] phone  used.

## 2018-12-29 NOTE — ED CDU PROVIDER DISPOSITION NOTE - NSFOLLOWUPCLINICS_GEN_ALL_ED_FT
[facility] General Internal Medicine  General Internal Medicine  [address] [id number]  Phone: [phone]  Fax:   Follow Up Time: 1-3 Days

## 2018-12-29 NOTE — ED CDU PROVIDER DISPOSITION NOTE - PLAN OF CARE
1. Take antibiotic as prescribed.  Increase fluids. Take Motrin 600mg every 8 hours with food if needed for pain. Take Tylenol 650mg every 6-8 hours as needed for fever.   2.  Follow up with your PMD or our medicine clinic ([phone]) within 48-72 hours.  3. Worsening pain, new fever, chills, nausea, vomiting return to ER 1. Take antibiotic as prescribed. Take Zofran oral resolving tablet every 8 hours as needed for nausea. Take Motrin 600mg every 8 hours with food if needed for pain. Take Tylenol 650mg every 6-8 hours as needed for fever. Drink plenty of fluids.  2.  Follow up with your PMD or our medicine clinic ([phone]) within 48-72 hours.  3. Worsening pain, new fever, chills, nausea, vomiting return to ER

## 2018-12-29 NOTE — ED CDU PROVIDER INITIAL DAY NOTE - OBJECTIVE STATEMENT
42yo F with pmhx HTN p/w CC of abdominal pain and fever x1 day. States symptoms started around noon, accompanied by nausea, nbnb vomiting, and diarrhea. Pt. also endorses 2 weeks of dysuria and frequency. She last took tylenol 4 hours ago for fever. She denies hematuria, flank pain, cp, sob, back pain, or other symptoms.  In ED, WBC 15.1, UA positive for UTI, other labs unremarkable, RVP negative, CXR negative, CT a/p showed cyst in L adnexa, TVUS showed b/l ovarian cysts. GYN c/s'd, no intervention warranted. pt started on ceftriaxone for clinical pyelo and sent to CDU for continued monitoring and antibiotics.

## 2018-12-29 NOTE — ED ADULT NURSE REASSESSMENT NOTE - NS ED NURSE REASSESS COMMENT FT1
07.00 Am Received pt from MICHAEL Mac  Pt is observed for pyelonephritis   07.30 Am  pt  is alert and responsive, oriented x4, denies any respiratory distress, SOB, or difficulty breathing. pt is currently pain free at this time, Pt denies abdominal pain, nausea. dysuria and frequency of urination episodes are decreasing . Due antibiotics given as per order  . IV in place, patent and free of signs of infiltration, on continuos cardiac monitoring as ordered, sinus tachycardia low 98 to 100's.  pt denies chest pain or palpitations, V/S stable, pt afebrile now , pt denies pain at this time. Pt educated on unit and unit rules, instructed patient to notify RN of any needed assistance, Pt verbalizes understanding, Call bell placed within reach. Safety maintained. Will continue to monitor.  09.00 Am pt got evaluated by Dr Ronen Zuleta

## 2018-12-29 NOTE — ED CDU PROVIDER DISPOSITION NOTE - NSFOLLOWUPINSTRUCTIONS_ED_ALL_ED_FT
1. Take antibiotic as prescribed. Take Zofran oral resolving tablet every 8 hours as needed for nausea. Take Motrin 600mg every 8 hours with food if needed for pain. Take Tylenol 650mg every 6-8 hours as needed for fever. Drink plenty of fluids.  2.  Follow up with your PMD or our medicine clinic ([phone]) within 48-72 hours.  3. Worsening pain, new fever, chills, nausea, vomiting return to ER

## 2018-12-29 NOTE — ED CDU PROVIDER INITIAL DAY NOTE - FAMILY HISTORY
Father  Still living? Yes, Estimated age: Age Unknown  Family history of diabetes mellitus (DM), Age at diagnosis: Age Unknown

## 2018-12-29 NOTE — ED CDU PROVIDER INITIAL DAY NOTE - PROGRESS NOTE DETAILS
Pt seen at bedside. Pt in NAD, comfortable. VS stable from last reading- pt afebrile with mild sinus tachycardia on tele (90s-low 100s).  #[id number], pt states she is feeling better. Was previously having some nausea, reports vomiting last about 4 hours ago, no current nausea. 1 loose BM ~30mins ago, brown non-bloody. Pt also notes mild suprapubic pressure that has improved since beginning abx. Pt denies difficulty urinating, CP, SOB, back/flank pain, rash. Will continue to monitor and pt will attempt to tolerate PO at breakfast. Pt seen at bedside. Pt resting comfortably, NAD. VS stable from last reading- low grade temperature but no fever and no antipyretics given in >8 hours. Low grade sinus tachycardia on tele. Pt tolerated PO for breakfast and has had no further episodes of vomiting. Will continue to monitor. Pt seen at bedside. Pt in NAD, comfortable. VS stable from last reading- pt afebrile with mild sinus tachycardia on tele (90s-low 100s).  #[id number], pt states she is feeling better. Was previously having some nausea, reports vomiting last about 4 hours ago, no current nausea. 1 loose BM ~30mins ago, brown non-bloody. Pt also notes mild suprapubic pressure that has improved since beginning abx, also has improving but still present dysuria. Pt denies difficulty urinating, CP, SOB, back/flank pain, rash. Abd soft mild TTP suprapubic area no CVAT. Will continue to monitor and pt will attempt to tolerate PO at breakfast. Pt seen at bedside. Pt in NAD, comfortable. VS stable from last reading- afebrile. Sinus tachycadia to NSR in the high 90s on tele. Missy  Jerel #[id number]- Pt states she is feeling much better. She states she intermittently experiences a mild pressure in the lower abdomen/suprapubic area but it is greatly improved. Pt states she does have some nausea but has not vomited since 3 or 4am, tolerated PO without issues. Abd NABS soft NTND, no flank pain. WIll continue to monitor. Pt seen at bedside. Pt in NAD, comfortable. VSS, afebrile, she states she is feeling much better. Stoill with mild epigastric pain however pain is much improved. No nausea/vomting, she has been able tot otolerate PO. Abd NABS soft NTND, no flank pain, pt is ready for d/c.

## 2019-01-03 LAB
CULTURE RESULTS: SIGNIFICANT CHANGE UP
CULTURE RESULTS: SIGNIFICANT CHANGE UP
SPECIMEN SOURCE: SIGNIFICANT CHANGE UP
SPECIMEN SOURCE: SIGNIFICANT CHANGE UP

## 2019-01-07 ENCOUNTER — APPOINTMENT (OUTPATIENT)
Dept: INTERNAL MEDICINE | Facility: CLINIC | Age: 44
End: 2019-01-07

## 2019-01-07 ENCOUNTER — OUTPATIENT (OUTPATIENT)
Dept: OUTPATIENT SERVICES | Facility: HOSPITAL | Age: 44
LOS: 1 days | End: 2019-01-07
Payer: SELF-PAY

## 2019-01-07 VITALS
SYSTOLIC BLOOD PRESSURE: 124 MMHG | WEIGHT: 202 LBS | BODY MASS INDEX: 38.14 KG/M2 | DIASTOLIC BLOOD PRESSURE: 70 MMHG | HEIGHT: 61 IN

## 2019-01-07 DIAGNOSIS — I10 ESSENTIAL (PRIMARY) HYPERTENSION: ICD-10-CM

## 2019-01-07 DIAGNOSIS — E78.5 HYPERLIPIDEMIA, UNSPECIFIED: ICD-10-CM

## 2019-01-07 PROCEDURE — G0463: CPT

## 2019-01-08 NOTE — HISTORY OF PRESENT ILLNESS
[Post-hospitalization from ___ Hospital] : Post-hospitalization from [unfilled] Hospital [Discharged on ___] : discharged on [unfilled] [FreeTextEntry2] : Patient is a 44 y/o F with a PMH significant for HTN who presents today for hospital follow up. She presented to the ED for nausea, abd pain and dysuria x 2 weeks. At the ED, patient had a white count to 15, images unremarkable. Gyn consulted given history of ovarian cyst. Patient eventually dx with pyonephritis and sent home on PO Cephalexin & Zofran. \par \par Since discharge, patient reports that all of her symptoms had resolved. She only took the Cephalexin BID (supposed to be taking it TID) on Sunday and today. Never had anymore nausea, so did not take Zofran. ROS at the time of office visit were all negative. \par \par Of note, patient was on ARB and BB for HTN and was help with concern for sepsis in the ED.

## 2019-01-08 NOTE — PHYSICAL EXAM
[No Acute Distress] : no acute distress [Well Nourished] : well nourished [Well Developed] : well developed [PERRL] : pupils equal round and reactive to light [Normal Oropharynx] : the oropharynx was normal [Supple] : supple [No Respiratory Distress] : no respiratory distress  [Clear to Auscultation] : lungs were clear to auscultation bilaterally [No Accessory Muscle Use] : no accessory muscle use [Normal Rate] : normal rate  [Regular Rhythm] : with a regular rhythm [Normal S1, S2] : normal S1 and S2 [No Murmur] : no murmur heard [Pedal Pulses Present] : the pedal pulses are present [No Edema] : there was no peripheral edema [Soft] : abdomen soft [Non Tender] : non-tender [Non-distended] : non-distended [Normal Bowel Sounds] : normal bowel sounds [Normal Posterior Cervical Nodes] : no posterior cervical lymphadenopathy [Normal Anterior Cervical Nodes] : no anterior cervical lymphadenopathy [No CVA Tenderness] : no CVA  tenderness [No Rash] : no rash [Normal Gait] : normal gait [de-identified] : No suprapubic tenderness

## 2019-01-08 NOTE — ASSESSMENT
[FreeTextEntry1] : Patient is a 44 y/o F with a PMH significant for HTN who presents today for hospital follow up for UTI, ? pyelonephritis. \par \par UTI w/ ? pyelonephritis:\par - discharged with 10 day course of Cephalexin, instruct patient to take the Abx TID\par - return to office in two weeks for UA and UCx\par - educated patient to watch out for symptoms such as fever, chill, n/v, dysuria, and will need to go back to the hospital if develop any of the above symptoms\par \par HCM:\par - patient /90 on my PE, HR 78\par - instructed patient to restart Lisinopril (s/p tubal ligation), and hold metoprolol for now. \par - will re-assess BP in two weeks before restarting BB\par

## 2019-01-14 DIAGNOSIS — N10 ACUTE PYELONEPHRITIS: ICD-10-CM

## 2019-01-22 ENCOUNTER — APPOINTMENT (OUTPATIENT)
Dept: INTERNAL MEDICINE | Facility: CLINIC | Age: 44
End: 2019-01-22

## 2019-01-22 ENCOUNTER — OUTPATIENT (OUTPATIENT)
Dept: OUTPATIENT SERVICES | Facility: HOSPITAL | Age: 44
LOS: 1 days | End: 2019-01-22
Payer: SELF-PAY

## 2019-01-22 VITALS
SYSTOLIC BLOOD PRESSURE: 142 MMHG | HEIGHT: 61 IN | DIASTOLIC BLOOD PRESSURE: 100 MMHG | BODY MASS INDEX: 38.14 KG/M2 | WEIGHT: 202 LBS

## 2019-01-22 DIAGNOSIS — N10 ACUTE PYELONEPHRITIS: ICD-10-CM

## 2019-01-22 DIAGNOSIS — I10 ESSENTIAL (PRIMARY) HYPERTENSION: ICD-10-CM

## 2019-01-22 PROCEDURE — G0463: CPT

## 2019-01-25 NOTE — PHYSICAL EXAM
[No Acute Distress] : no acute distress [Well Nourished] : well nourished [Well Developed] : well developed [Normal Sclera/Conjunctiva] : normal sclera/conjunctiva [PERRL] : pupils equal round and reactive to light [EOMI] : extraocular movements intact [Normal Outer Ear/Nose] : the outer ears and nose were normal in appearance [Normal Oropharynx] : the oropharynx was normal [No JVD] : no jugular venous distention [Supple] : supple [No Respiratory Distress] : no respiratory distress  [Clear to Auscultation] : lungs were clear to auscultation bilaterally [Normal Rate] : normal rate  [Regular Rhythm] : with a regular rhythm [Normal S1, S2] : normal S1 and S2 [Soft] : abdomen soft [Non Tender] : non-tender [Non-distended] : non-distended [Normal Supraclavicular Nodes] : no supraclavicular lymphadenopathy [Normal Posterior Cervical Nodes] : no posterior cervical lymphadenopathy [Normal Anterior Cervical Nodes] : no anterior cervical lymphadenopathy [No CVA Tenderness] : no CVA  tenderness [No Spinal Tenderness] : no spinal tenderness [No Joint Swelling] : no joint swelling [Grossly Normal Strength/Tone] : grossly normal strength/tone [No Rash] : no rash [No Skin Lesions] : no skin lesions [Normal Gait] : normal gait [No Focal Deficits] : no focal deficits [Speech Grossly Normal] : speech grossly normal [Normal Affect] : the affect was normal [Normal Mood] : the mood was normal

## 2019-02-05 NOTE — ASSESSMENT
[FreeTextEntry1] : 44 y/o F with a PMH significant for HTN present for follow up visit. \par \par #HTN\par -/100, repeat BP of 146/96\par -Restart home metorpolol. Rx sent\par \par #HCM\par -Flu shot and tdap today\par -RTC in 3 months\par \par Dominick Bowman\par \par He Jose - PGY2\par [employer]

## 2019-02-05 NOTE — REVIEW OF SYSTEMS
[Fever] : no fever [Chills] : no chills [Chest Pain] : no chest pain [Palpitations] : no palpitations [Lower Ext Edema] : no lower extremity edema [Shortness Of Breath] : no shortness of breath [Cough] : no cough [Dyspnea on Exertion] : no dyspnea on exertion [Abdominal Pain] : no abdominal pain [Nausea] : no nausea [Constipation] : no constipation [Diarrhea] : diarrhea [Vomiting] : no vomiting [Dysuria] : no dysuria [Frequency] : no frequency [Joint Pain] : no joint pain [Muscle Pain] : no muscle pain [Itching] : no itching [Skin Rash] : no skin rash [Headache] : no headache [Dizziness] : no dizziness

## 2019-02-05 NOTE — HISTORY OF PRESENT ILLNESS
[de-identified] : 44 y/o F with a PMH significant for HTN present for follow up visit. \par \par Patient present to ED On 12/29 for nausea, abd pain and dysuria x 2 weeks. At the ED, patient had a white count to 15, images unremarkable. Gyn consulted given history of ovarian cyst. Patient eventually dx with pyonephritis and sent home on PO Cephalexin & Zofran. Her home BP medication including lisinopril and metoprolol was held 2/2 concern for sepsis. She was restarted on lisinopril during last clinic visit on 1/7/2019, metoprolol still on hold. Currently patient has no complaint.

## 2019-02-26 ENCOUNTER — APPOINTMENT (OUTPATIENT)
Dept: INTERNAL MEDICINE | Facility: CLINIC | Age: 44
End: 2019-02-26

## 2019-02-26 ENCOUNTER — OUTPATIENT (OUTPATIENT)
Dept: OUTPATIENT SERVICES | Facility: HOSPITAL | Age: 44
LOS: 1 days | End: 2019-02-26
Payer: SELF-PAY

## 2019-02-26 VITALS
HEIGHT: 61 IN | WEIGHT: 206 LBS | DIASTOLIC BLOOD PRESSURE: 80 MMHG | BODY MASS INDEX: 38.89 KG/M2 | SYSTOLIC BLOOD PRESSURE: 124 MMHG

## 2019-02-26 DIAGNOSIS — R20.0 ANESTHESIA OF SKIN: ICD-10-CM

## 2019-02-26 DIAGNOSIS — Z87.440 PERSONAL HISTORY OF URINARY (TRACT) INFECTIONS: ICD-10-CM

## 2019-02-26 DIAGNOSIS — Z01.419 ENCOUNTER FOR GYNECOLOGICAL EXAMINATION (GENERAL) (ROUTINE) W/OUT ABNORMAL FINDINGS: ICD-10-CM

## 2019-02-26 DIAGNOSIS — I10 ESSENTIAL (PRIMARY) HYPERTENSION: ICD-10-CM

## 2019-02-26 DIAGNOSIS — B37.3 CANDIDIASIS OF VULVA AND VAGINA: ICD-10-CM

## 2019-02-26 DIAGNOSIS — Z87.39 PERSONAL HISTORY OF OTHER DISEASES OF THE MUSCULOSKELETAL SYSTEM AND CONNECTIVE TISSUE: ICD-10-CM

## 2019-02-26 DIAGNOSIS — Z87.898 PERSONAL HISTORY OF OTHER SPECIFIED CONDITIONS: ICD-10-CM

## 2019-02-26 DIAGNOSIS — L68.0 HIRSUTISM: ICD-10-CM

## 2019-02-26 DIAGNOSIS — R60.0 LOCALIZED EDEMA: ICD-10-CM

## 2019-02-26 PROCEDURE — 90471 IMMUNIZATION ADMIN: CPT

## 2019-02-26 PROCEDURE — 90715 TDAP VACCINE 7 YRS/> IM: CPT

## 2019-02-26 PROCEDURE — G0008: CPT

## 2019-02-26 PROCEDURE — 90688 IIV4 VACCINE SPLT 0.5 ML IM: CPT

## 2019-02-26 PROCEDURE — G0463: CPT

## 2019-02-26 NOTE — PHYSICAL EXAM
[No Acute Distress] : no acute distress [Well Nourished] : well nourished [Normal Sclera/Conjunctiva] : normal sclera/conjunctiva [PERRL] : pupils equal round and reactive to light [Normal Outer Ear/Nose] : the outer ears and nose were normal in appearance [Normal Oropharynx] : the oropharynx was normal [Supple] : supple [No Lymphadenopathy] : no lymphadenopathy [No Respiratory Distress] : no respiratory distress  [Clear to Auscultation] : lungs were clear to auscultation bilaterally [Normal Rate] : normal rate  [Regular Rhythm] : with a regular rhythm [Normal S1, S2] : normal S1 and S2 [Soft] : abdomen soft [Non Tender] : non-tender [Non-distended] : non-distended [No Joint Swelling] : no joint swelling [Grossly Normal Strength/Tone] : grossly normal strength/tone [No Rash] : no rash [No Skin Lesions] : no skin lesions [Normal Gait] : normal gait [No Focal Deficits] : no focal deficits

## 2019-02-28 DIAGNOSIS — Z23 ENCOUNTER FOR IMMUNIZATION: ICD-10-CM

## 2019-03-05 NOTE — REVIEW OF SYSTEMS
[Fever] : no fever [Chills] : no chills [Sore Throat] : no sore throat [Postnasal Drip] : no postnasal drip [Chest Pain] : no chest pain [Palpitations] : no palpitations [Shortness Of Breath] : no shortness of breath [Cough] : no cough [Abdominal Pain] : no abdominal pain [Nausea] : no nausea [Constipation] : no constipation [Diarrhea] : diarrhea [Vomiting] : no vomiting [Dysuria] : no dysuria [Frequency] : no frequency [Joint Pain] : no joint pain [Muscle Pain] : no muscle pain [Itching] : no itching [Skin Rash] : no skin rash [Headache] : no headache [Dizziness] : no dizziness

## 2019-03-05 NOTE — ASSESSMENT
[FreeTextEntry1] : 42 y/o F with a PMH significant for HTN present for follow up visit.\par \par #HTN\par -Switch metoprolol tartrate to succinate 25mg qd. \par -C/w lisinopril. Encourge daily bp check. \par -Encourage continue with exercise and weight loss\par \par #HCM\par -Flu shot and Tdap today. \par -RTC PRN\par \par D/w Dr. Bowman\par \par He Jose - PGY2\par [employer] \par \par

## 2019-03-05 NOTE — HISTORY OF PRESENT ILLNESS
[de-identified] : 42 y/o F with a PMH significant for HTN present for follow up visit.\par \par Patient has been checking her BP at home about once per week, with average ranging from 130-140/80-90s. She states that the metoprolol pill is so small and difficult to cut into half. No other complain at this time. \par \par

## 2019-08-06 ENCOUNTER — OUTPATIENT (OUTPATIENT)
Dept: OUTPATIENT SERVICES | Facility: HOSPITAL | Age: 44
LOS: 1 days | End: 2019-08-06
Payer: SELF-PAY

## 2019-08-06 ENCOUNTER — APPOINTMENT (OUTPATIENT)
Dept: INTERNAL MEDICINE | Facility: CLINIC | Age: 44
End: 2019-08-06

## 2019-08-06 VITALS
HEART RATE: 92 BPM | WEIGHT: 211 LBS | OXYGEN SATURATION: 95 % | HEIGHT: 61 IN | DIASTOLIC BLOOD PRESSURE: 80 MMHG | SYSTOLIC BLOOD PRESSURE: 130 MMHG | BODY MASS INDEX: 39.84 KG/M2

## 2019-08-06 DIAGNOSIS — Z23 ENCOUNTER FOR IMMUNIZATION: ICD-10-CM

## 2019-08-06 DIAGNOSIS — I10 ESSENTIAL (PRIMARY) HYPERTENSION: ICD-10-CM

## 2019-08-06 PROCEDURE — G0463: CPT

## 2019-08-06 RX ORDER — METOPROLOL SUCCINATE 25 MG/1
25 TABLET, EXTENDED RELEASE ORAL DAILY
Qty: 1 | Refills: 5 | Status: DISCONTINUED | COMMUNITY
Start: 2019-02-26 | End: 2019-08-06

## 2019-08-08 DIAGNOSIS — R73.03 PREDIABETES: ICD-10-CM

## 2019-08-08 DIAGNOSIS — E78.5 HYPERLIPIDEMIA, UNSPECIFIED: ICD-10-CM

## 2019-08-08 DIAGNOSIS — H53.8 OTHER VISUAL DISTURBANCES: ICD-10-CM

## 2019-08-08 NOTE — PHYSICAL EXAM
[No Acute Distress] : no acute distress [Well Nourished] : well nourished [Well Developed] : well developed [Normal Sclera/Conjunctiva] : normal sclera/conjunctiva [PERRL] : pupils equal round and reactive to light [EOMI] : extraocular movements intact [Normal Outer Ear/Nose] : the outer ears and nose were normal in appearance [No Carotid Bruits] : no carotid bruits [Soft] : abdomen soft [Non Tender] : non-tender [Non-distended] : non-distended [No Masses] : no abdominal mass palpated [No HSM] : no HSM [Normal] : normal gait, coordination grossly intact, no focal deficits and deep tendon reflexes were 2+ and symmetric [de-identified] : Fundoscopic exam demonstrated normal optic disks and intact retinal arteries. [de-identified] : CN II-XII intact

## 2019-08-08 NOTE — HISTORY OF PRESENT ILLNESS
[FreeTextEntry1] : Vision changes [de-identified] : 45 yo F with a PMH of HTN presenting with two weeks of intermittent vision changes. Several times over the last two weeks, she describes darkness falling down on her vision "like smoke." It only happens when she rises from sitting or lying down. These episodes are associated with mild dizziness. The episodes last 5 minutes at a time. She denies headache, numbness, weakness, chest pain, shortness of breath, or numbness.\par \par She ran out of her lisinopril two weeks ago. Since then, she has only been taking metoprolol 12.5 mg daily. She does not check her blood pressure at home.

## 2019-08-08 NOTE — PLAN
[FreeTextEntry1] : #Pre-diabetes\par -Serum A1c\par \par #HTN\par -D/c metoprolol\par -C/w lisinopril 10 mg\par -Will f/u BMP given restarting lisinopril\par \par #HLD\par -Repeat lipid profile\par -F/u and will consider starting statin\par \par #Health maintenance\par -Referral to obgyn for cervical cancer screening\par

## 2019-08-08 NOTE — REVIEW OF SYSTEMS
[Vision Problems] : vision problems [Dizziness] : dizziness [Fever] : no fever [Chills] : no chills [Chest Pain] : no chest pain [Palpitations] : no palpitations [Shortness Of Breath] : no shortness of breath [Cough] : no cough [Abdominal Pain] : no abdominal pain [Constipation] : no constipation [Diarrhea] : diarrhea [Dysuria] : no dysuria [Headache] : no headache

## 2019-08-08 NOTE — END OF VISIT
[] : Resident [FreeTextEntry3] : 44yoF HTN, pre-DM. C/o vision changes with standing – “goes gray,” some light-headedness. No Lisinopril x2 weeks, taking metoprolol 12.5mg (1/2 prescribed dose). No other sx. /80, p92 carotid upstroke +2 bilaterally, no bruits, fundi visualized bilaterally, no a-v knicking, no hemorrhages, cups/disks bilaterally appear grossly wnl. Rest of exam wnl. A/P: sx most likely related to delayed baroreceptor reflex with beta blockers (though HR near higher end of normal), agree with plan to transition over to lisinopril as single agent especially as she also has pre-DM. Cautioned on standing slowly. For any change, call to report. Consider ophthalmology referral if none recent. RTC soon for CPE

## 2019-10-09 ENCOUNTER — LABORATORY RESULT (OUTPATIENT)
Age: 44
End: 2019-10-09

## 2019-10-09 ENCOUNTER — OUTPATIENT (OUTPATIENT)
Dept: OUTPATIENT SERVICES | Facility: HOSPITAL | Age: 44
LOS: 1 days | End: 2019-10-09
Payer: SELF-PAY

## 2019-10-09 PROCEDURE — 83036 HEMOGLOBIN GLYCOSYLATED A1C: CPT

## 2019-10-09 PROCEDURE — 80061 LIPID PANEL: CPT

## 2019-10-09 PROCEDURE — 80048 BASIC METABOLIC PNL TOTAL CA: CPT

## 2019-10-09 PROCEDURE — 85027 COMPLETE CBC AUTOMATED: CPT

## 2019-10-09 PROCEDURE — 36415 COLL VENOUS BLD VENIPUNCTURE: CPT

## 2019-10-10 DIAGNOSIS — E78.5 HYPERLIPIDEMIA, UNSPECIFIED: ICD-10-CM

## 2019-10-10 DIAGNOSIS — R73.03 PREDIABETES: ICD-10-CM

## 2019-10-10 DIAGNOSIS — I10 ESSENTIAL (PRIMARY) HYPERTENSION: ICD-10-CM

## 2019-10-10 DIAGNOSIS — H53.8 OTHER VISUAL DISTURBANCES: ICD-10-CM

## 2019-10-11 ENCOUNTER — RESULT REVIEW (OUTPATIENT)
Age: 44
End: 2019-10-11

## 2019-10-11 LAB
ANION GAP SERPL CALC-SCNC: 20 MMOL/L — HIGH (ref 5–17)
BUN SERPL-MCNC: 18 MG/DL — SIGNIFICANT CHANGE UP (ref 7–23)
CALCIUM SERPL-MCNC: 9.3 MG/DL — SIGNIFICANT CHANGE UP (ref 8.4–10.5)
CHLORIDE SERPL-SCNC: 105 MMOL/L — SIGNIFICANT CHANGE UP (ref 96–108)
CHOLEST SERPL-MCNC: 186 MG/DL — SIGNIFICANT CHANGE UP (ref 10–199)
CO2 SERPL-SCNC: 15 MMOL/L — LOW (ref 22–31)
CREAT SERPL-MCNC: 0.76 MG/DL — SIGNIFICANT CHANGE UP (ref 0.5–1.3)
GLUCOSE SERPL-MCNC: 111 MG/DL — HIGH (ref 70–99)
HBA1C BLD-MCNC: 5.9 % — HIGH (ref 4–5.6)
HCT VFR BLD CALC: 38.4 % — SIGNIFICANT CHANGE UP (ref 34.5–45)
HDLC SERPL-MCNC: 41 MG/DL — LOW
HGB BLD-MCNC: 11.1 G/DL — LOW (ref 11.5–15.5)
LIPID PNL WITH DIRECT LDL SERPL: 92 MG/DL — SIGNIFICANT CHANGE UP
MCHC RBC-ENTMCNC: 24.7 PG — LOW (ref 27–34)
MCHC RBC-ENTMCNC: 29.4 GM/DL — LOW (ref 32–36)
MCV RBC AUTO: 84 FL — SIGNIFICANT CHANGE UP (ref 80–100)
PLATELET # BLD AUTO: 305 K/UL — SIGNIFICANT CHANGE UP (ref 150–400)
POTASSIUM SERPL-MCNC: 4.9 MMOL/L — SIGNIFICANT CHANGE UP (ref 3.5–5.3)
POTASSIUM SERPL-SCNC: 4.9 MMOL/L — SIGNIFICANT CHANGE UP (ref 3.5–5.3)
RBC # BLD: 4.57 M/UL — SIGNIFICANT CHANGE UP (ref 3.8–5.2)
RBC # FLD: 15 % — HIGH (ref 10.3–14.5)
SODIUM SERPL-SCNC: 140 MMOL/L — SIGNIFICANT CHANGE UP (ref 135–145)
TOTAL CHOLESTEROL/HDL RATIO MEASUREMENT: 4.5 RATIO — SIGNIFICANT CHANGE UP (ref 3.3–7.1)
TRIGL SERPL-MCNC: 264 MG/DL — HIGH (ref 10–149)
WBC # BLD: 8.47 K/UL — SIGNIFICANT CHANGE UP (ref 3.8–10.5)
WBC # FLD AUTO: 8.47 K/UL — SIGNIFICANT CHANGE UP (ref 3.8–10.5)

## 2019-10-22 ENCOUNTER — LABORATORY RESULT (OUTPATIENT)
Age: 44
End: 2019-10-22

## 2019-10-22 ENCOUNTER — APPOINTMENT (OUTPATIENT)
Dept: OBGYN | Facility: CLINIC | Age: 44
End: 2019-10-22
Payer: SELF-PAY

## 2019-10-22 ENCOUNTER — OUTPATIENT (OUTPATIENT)
Dept: OUTPATIENT SERVICES | Facility: HOSPITAL | Age: 44
LOS: 1 days | End: 2019-10-22
Payer: SELF-PAY

## 2019-10-22 VITALS — BODY MASS INDEX: 38.73 KG/M2 | WEIGHT: 205 LBS | DIASTOLIC BLOOD PRESSURE: 90 MMHG | SYSTOLIC BLOOD PRESSURE: 135 MMHG

## 2019-10-22 DIAGNOSIS — N76.0 ACUTE VAGINITIS: ICD-10-CM

## 2019-10-22 PROCEDURE — 99396 PREV VISIT EST AGE 40-64: CPT | Mod: NC

## 2019-10-22 PROCEDURE — 88141 CYTOPATH C/V INTERPRET: CPT

## 2019-10-22 PROCEDURE — 87625 HPV TYPES 16 & 18 ONLY: CPT

## 2019-10-22 PROCEDURE — G0463: CPT

## 2019-10-22 PROCEDURE — 87624 HPV HI-RISK TYP POOLED RSLT: CPT

## 2019-10-23 LAB — HPV HIGH+LOW RISK DNA PNL CVX: DETECTED

## 2019-10-23 NOTE — PHYSICAL EXAM
[Awake] : awake [Alert] : alert [Soft] : soft [Oriented x3] : oriented to person, place, and time [No Lesions] : no genitalia lesions [Normal] : clitoris [Pink Rugae] : pink rugae [Labia Minora] : labia minora [Labia Majora] : labia major [No Bleeding] : there was no active vaginal bleeding [Pap Obtained] : a Pap smear was performed [Normal Position] : in a normal position [Uterine Adnexae] : were not tender and not enlarged [RRR, No Murmurs] : RRR, no murmurs [CTAB] : CTAB [Acute Distress] : no acute distress [Mass] : no breast mass [Axillary LAD] : no axillary lymphadenopathy [Nipple Discharge] : no nipple discharge [Tender] : non tender [Distended] : not distended [Depressed Mood] : not depressed [Flat Affect] : affect not flat [Labia Majora Erythema] : no erythema of the labia majora [Labia Minora Erythema] : no erythema of the labia minora [Discharge] : had no discharge [Tenderness] : nontender [Motion Tenderness] : there was no cervical motion tenderness [Adnexa Tenderness] : were not tender [Ovarian Mass (___ Cm)] : there were no adnexal masses [FreeTextEntry6] : fundus nontender, adnexa nonpalpable b/l

## 2019-10-28 LAB — CYTOLOGY SPEC DOC CYTO: SIGNIFICANT CHANGE UP

## 2019-11-01 DIAGNOSIS — Z01.419 ENCOUNTER FOR GYNECOLOGICAL EXAMINATION (GENERAL) (ROUTINE) WITHOUT ABNORMAL FINDINGS: ICD-10-CM

## 2020-01-30 ENCOUNTER — RX RENEWAL (OUTPATIENT)
Age: 45
End: 2020-01-30

## 2020-02-10 ENCOUNTER — OUTPATIENT (OUTPATIENT)
Dept: OUTPATIENT SERVICES | Facility: HOSPITAL | Age: 45
LOS: 1 days | End: 2020-02-10
Payer: SELF-PAY

## 2020-02-10 ENCOUNTER — APPOINTMENT (OUTPATIENT)
Dept: INTERNAL MEDICINE | Facility: CLINIC | Age: 45
End: 2020-02-10

## 2020-02-10 VITALS
DIASTOLIC BLOOD PRESSURE: 76 MMHG | SYSTOLIC BLOOD PRESSURE: 138 MMHG | OXYGEN SATURATION: 97 % | BODY MASS INDEX: 39.65 KG/M2 | WEIGHT: 210 LBS | HEART RATE: 79 BPM | HEIGHT: 61 IN

## 2020-02-10 DIAGNOSIS — H53.8 OTHER VISUAL DISTURBANCES: ICD-10-CM

## 2020-02-10 DIAGNOSIS — I10 ESSENTIAL (PRIMARY) HYPERTENSION: ICD-10-CM

## 2020-02-10 PROCEDURE — G0463: CPT

## 2020-02-10 RX ORDER — METOPROLOL TARTRATE 75 MG/1
TABLET, FILM COATED ORAL
Refills: 0 | Status: DISCONTINUED | COMMUNITY
End: 2020-02-10

## 2020-02-10 NOTE — PHYSICAL EXAM
[Well-Appearing] : well-appearing [No Acute Distress] : no acute distress [Normal Sclera/Conjunctiva] : normal sclera/conjunctiva [PERRL] : pupils equal round and reactive to light [EOMI] : extraocular movements intact [Normal Outer Ear/Nose] : the outer ears and nose were normal in appearance [No JVD] : no jugular venous distention [No Lymphadenopathy] : no lymphadenopathy [Normal Oropharynx] : the oropharynx was normal [No Respiratory Distress] : no respiratory distress  [No Accessory Muscle Use] : no accessory muscle use [Clear to Auscultation] : lungs were clear to auscultation bilaterally [Normal Rate] : normal rate  [Regular Rhythm] : with a regular rhythm [No Murmur] : no murmur heard [Normal S1, S2] : normal S1 and S2 [Non Tender] : non-tender [Soft] : abdomen soft [Non-distended] : non-distended [No HSM] : no HSM [No Masses] : no abdominal mass palpated [No Rash] : no rash [Normal Bowel Sounds] : normal bowel sounds

## 2020-02-11 NOTE — HISTORY OF PRESENT ILLNESS
[FreeTextEntry8] : 44 year old female pmh HTN, pre-diabetes, HPV + who presents for blood pressure check. Checks blood pressure once a month at the pharmacy but is unable to tell me what the most recent reading was. Endorses blurry vision of 3 months duration but denies eye pain, erythema or discharge. Denies HA, CP, SOB, ARRIAGA, leg edema, orthopnea or PND. Walking for exercise. Endorses eating a low sodium low carb diet. Manual blood pressure 135/80.

## 2020-02-11 NOTE — REVIEW OF SYSTEMS
[Vision Problems] : vision problems [Muscle Pain] : muscle pain [Chills] : no chills [Fever] : no fever [Pain] : no pain [Discharge] : no discharge [Chest Pain] : no chest pain [Lower Ext Edema] : no lower extremity edema [Paroxysmal Nocturnal Dyspnea] : no paroxysmal nocturnal dyspnea [Shortness Of Breath] : no shortness of breath [Orthopnea] : no orthopnea [Cough] : no cough [Abdominal Pain] : no abdominal pain [Vomiting] : no vomiting [Nausea] : no nausea [Diarrhea] : diarrhea [FreeTextEntry9] : Right arm muscle pain [Headache] : no headache [Skin Rash] : no skin rash [Depression] : no depression

## 2020-02-11 NOTE — ASSESSMENT
[FreeTextEntry1] : 44 year old female pmh HTN, pre-diabetes, HPV + who presents for blood pressure check.\par \par #HTN\par -normotensive today\par -c/w lisinopril 10mg\par -switch metoprolol tartrate to metoprolol succinate 25mg \par -c/w low sodium diet \par \par #Obesity\par -BMI 39.7\par -counseled on diet and exercise \par -CPE 6months \par \par #HPV\par -GYN note Nov 2019 states ASCUS/HR HPV+ and patient should undergo colposcopy \par -lost to follow-up therefore GYN referral given. Counseled on the importance of following up with GYN given risk of cervical cancer

## 2020-02-12 DIAGNOSIS — B97.7 PAPILLOMAVIRUS AS THE CAUSE OF DISEASES CLASSIFIED ELSEWHERE: ICD-10-CM

## 2020-02-12 DIAGNOSIS — H53.8 OTHER VISUAL DISTURBANCES: ICD-10-CM

## 2020-03-27 ENCOUNTER — EMERGENCY (EMERGENCY)
Facility: HOSPITAL | Age: 45
LOS: 1 days | Discharge: HOME | End: 2020-03-27
Attending: EMERGENCY MEDICINE
Payer: MEDICAID

## 2020-03-27 VITALS
SYSTOLIC BLOOD PRESSURE: 138 MMHG | OXYGEN SATURATION: 100 % | HEART RATE: 115 BPM | TEMPERATURE: 98 F | WEIGHT: 220.02 LBS | DIASTOLIC BLOOD PRESSURE: 92 MMHG | RESPIRATION RATE: 22 BRPM | HEIGHT: 62 IN

## 2020-03-27 VITALS — TEMPERATURE: 98 F | OXYGEN SATURATION: 98 % | RESPIRATION RATE: 18 BRPM | HEART RATE: 97 BPM

## 2020-03-27 PROCEDURE — 99282 EMERGENCY DEPT VISIT SF MDM: CPT

## 2020-03-27 PROCEDURE — 99284 EMERGENCY DEPT VISIT MOD MDM: CPT

## 2020-03-27 RX ORDER — ACETAMINOPHEN 500 MG
975 TABLET ORAL ONCE
Refills: 0 | Status: DISCONTINUED | OUTPATIENT
Start: 2020-03-27 | End: 2020-03-31

## 2020-03-27 NOTE — ED PROVIDER NOTE - NSFOLLOWUPINSTRUCTIONS_ED_ALL_ED_FT
You were seen and evaluated in the Emergency Department for your viral upper respiratory symptoms, possibly COVID. As we are not testing patients for COVID that are stable for discharge, you should self-quarantine for presumed COVID. Please see the attached COVID information packet for management of your symptoms, and more information on the next steps including your self-quarantine measures. Remaining self-quarantined is crucial to limiting the spread of the virus.  You may also refer to the CDC website for more information: https://www.cdc.gov/coronavirus/2019-ncov/if-you-are-sick/steps-when-sick.html.      - Do NOT go to work, school or public areas.  Avoid using public transportation, ride sharing or taxis.  - Wear a mask whenever you are around other people.  - Avoid sharing personal household items.  You should NOT share dishes, drinking glasses, cups, eating utensils, towels or bedding with other people or pets in your home.  After using these items they should be washed thoroughly.    - Avoid touching your face including your eyes, nose and mouth with your hands.  - Wash your hands often with soap and water for at least 20 seconds.  You can also clean your hands with an alcohol based  that contains at least 60-95% alcohol.  You should wash/clean all surfaces of your hands.  Use soap and water preferentially.    At this time your clinical evaluation and history do not demonstrate any acute, life-threatening medical conditions warranting emergent treatment.     Continue to maintain oral hydration with plenty of fluids.  You may take Tylenol (Acetaminophen) every 8 hours with food (following the instructions on the medication insert information sheet for dosing) for fever control.  Do not exceed 3000 mg in 24 hours of acetaminophen (Tylenol).       Should you develop new or worsening symptoms including but not limited to chest pain, shortness of breath, abdominal pain, fevers, vomiting, diarrhea - please return to the ED for immediate evaluation.     Para mas informacion en espanol: https://espanol.cdc.gov/marisela/coronavirus/2019-ncov/if-you-are-sick/steps-when-sick.html

## 2020-03-27 NOTE — ED PROVIDER NOTE - PATIENT PORTAL LINK FT
You can access the FollowMyHealth Patient Portal offered by [facility] by registering at the following website: [email or web address]. By joining [employer]’s FollowMyHealth portal, you will also be able to view your health information using other applications (apps) compatible with our system.

## 2020-03-27 NOTE — ED PROVIDER NOTE - OBJECTIVE STATEMENT
Attending MD Fortune: I performed a medical screening exam, FT 3, interviewed in [place]    44F with PMH/PSH including HTN presents to the ED with concern for COVID19.  Reports has been having fevers, chills, cough, shortness of breath with coughing, anosmia, chest tightness, body aches for one week.  Denies abdominal pain, nausea, vomiting, diarrhea.  Denies shortness of breath at rest.  Reports has not taken Tylenol today but had taken previously with some improvement.  Denies ever tobacco, denies travel, known COVID.  Denies pregnancy.  Not [employer] employee.  On exam, NAD, vitals reviewed, not hypoxic, well appearing, oriented, clear voice, head NCAT, unlabored breathing, symmetric chest rise, no increased work of breathing with speaking, amb sat 98%, moving all extremities; A/P: 44F with concern for COVID 19, Ddx includes influenza, URI, likely COVID-19.  In accordance with the current established  protocol for suspected COVID-19 patients, this patient does not meet criteria for urgent COVID-19 PCR testing in the ED. Will give patient Tylenol and reassess.  Patient was advised to self-quarantine as per CDC recommendations.  Stable for discharge. Follow up instructions given, importance of follow up emphasized, return to ED parameters reviewed and patient verbalized understanding.  All questions answered, all concerns addressed.

## 2020-03-27 NOTE — ED ADULT NURSE NOTE - OBJECTIVE STATEMENT
43 y/o female presents to ED with flu-like symptoms. In NAD at this time. Pt is tachycardic. Tolerating PO. Pt had several cups of water. Pt HR normalized from 115 to 97. VS otherwise stable.

## 2020-03-27 NOTE — ED PROVIDER NOTE - ADDITIONAL NOTES AND INSTRUCTIONS:
https://www.cdc.gov/coronavirus/2019-ncov/if-you-are-sick/steps-when-sick.html  If you will be tested to determine if you are still contagious, you can leave home after these three things have happened: - You no longer have a fever (without the use medicine that reduces fevers) AND - other symptoms have improved (for example, when your cough or shortness of breath have improved) AND - you received two negative tests in a row, 24 hours apart. Your doctor will follow CDC guidelines.   If you will not have a test to determine if you are still contagious, you can leave home after these three things have happened: - You have had no fever for at least 72 hours (that is three full days of no fever without the use medicine that reduces fevers)  AND - other symptoms have improved (for example, when your cough or shortness of breath have improved)  AND - at least 7 days have passed since your symptoms first appeared.

## 2020-03-27 NOTE — ED PROVIDER NOTE - PROGRESS NOTE DETAILS
Attending MD Fortune: feels improved, vitals improved. Stable for discharge. Follow up instructions given, importance of follow up emphasized, return to ED parameters reviewed and patient verbalized understanding.  All questions answered, all concerns addressed.

## 2020-03-27 NOTE — ED PROVIDER NOTE - PHYSICAL EXAMINATION
Attending MD Fortune: See HPI Attending MD Fortune: See HPI  NAD, Tachycardia, Afebrile, Neck supple. Lungs clear. ABD soft, non tender. No CVA tender. Neuro- intact.

## 2020-05-27 ENCOUNTER — APPOINTMENT (OUTPATIENT)
Dept: INTERNAL MEDICINE | Facility: CLINIC | Age: 45
End: 2020-05-27

## 2020-06-30 ENCOUNTER — APPOINTMENT (OUTPATIENT)
Dept: INTERNAL MEDICINE | Facility: CLINIC | Age: 45
End: 2020-06-30

## 2020-07-15 ENCOUNTER — OUTPATIENT (OUTPATIENT)
Dept: OUTPATIENT SERVICES | Facility: HOSPITAL | Age: 45
LOS: 1 days | End: 2020-07-15
Payer: SELF-PAY

## 2020-07-15 ENCOUNTER — APPOINTMENT (OUTPATIENT)
Dept: INTERNAL MEDICINE | Facility: CLINIC | Age: 45
End: 2020-07-15

## 2020-07-15 VITALS
BODY MASS INDEX: 41.16 KG/M2 | DIASTOLIC BLOOD PRESSURE: 88 MMHG | SYSTOLIC BLOOD PRESSURE: 130 MMHG | WEIGHT: 218 LBS | HEIGHT: 61 IN

## 2020-07-15 DIAGNOSIS — E66.01 MORBID (SEVERE) OBESITY DUE TO EXCESS CALORIES: ICD-10-CM

## 2020-07-15 DIAGNOSIS — I10 ESSENTIAL (PRIMARY) HYPERTENSION: ICD-10-CM

## 2020-07-15 DIAGNOSIS — B97.7 PAPILLOMAVIRUS AS THE CAUSE OF DISEASES CLASSIFIED ELSEWHERE: ICD-10-CM

## 2020-07-15 DIAGNOSIS — R40.0 SOMNOLENCE: ICD-10-CM

## 2020-07-15 DIAGNOSIS — R73.03 PREDIABETES: ICD-10-CM

## 2020-07-15 PROCEDURE — G0463: CPT

## 2020-07-16 LAB
ALBUMIN SERPL ELPH-MCNC: 4.7 G/DL
ALP BLD-CCNC: 137 U/L
ALT SERPL-CCNC: 19 U/L
ANION GAP SERPL CALC-SCNC: 14 MMOL/L
AST SERPL-CCNC: 16 U/L
BASOPHILS # BLD AUTO: 0.05 K/UL
BASOPHILS NFR BLD AUTO: 0.5 %
BILIRUB SERPL-MCNC: <0.2 MG/DL
BUN SERPL-MCNC: 16 MG/DL
CALCIUM SERPL-MCNC: 9.5 MG/DL
CHLORIDE SERPL-SCNC: 102 MMOL/L
CHOLEST SERPL-MCNC: 201 MG/DL
CHOLEST/HDLC SERPL: 4.5 RATIO
CO2 SERPL-SCNC: 21 MMOL/L
CREAT SERPL-MCNC: 1.04 MG/DL
EOSINOPHIL # BLD AUTO: 0.14 K/UL
EOSINOPHIL NFR BLD AUTO: 1.5 %
ESTIMATED AVERAGE GLUCOSE: 128 MG/DL
GLUCOSE SERPL-MCNC: 102 MG/DL
HBA1C MFR BLD HPLC: 6.1 %
HCT VFR BLD CALC: 34.5 %
HDLC SERPL-MCNC: 45 MG/DL
HGB BLD-MCNC: 10.5 G/DL
IMM GRANULOCYTES NFR BLD AUTO: 0.7 %
LDLC SERPL CALC-MCNC: 101 MG/DL
LYMPHOCYTES # BLD AUTO: 2.99 K/UL
LYMPHOCYTES NFR BLD AUTO: 31.6 %
MAN DIFF?: NORMAL
MCHC RBC-ENTMCNC: 23.6 PG
MCHC RBC-ENTMCNC: 30.4 GM/DL
MCV RBC AUTO: 77.7 FL
MONOCYTES # BLD AUTO: 0.74 K/UL
MONOCYTES NFR BLD AUTO: 7.8 %
NEUTROPHILS # BLD AUTO: 5.47 K/UL
NEUTROPHILS NFR BLD AUTO: 57.9 %
PLATELET # BLD AUTO: 322 K/UL
POTASSIUM SERPL-SCNC: 4.2 MMOL/L
PROT SERPL-MCNC: 7.5 G/DL
RBC # BLD: 4.44 M/UL
RBC # FLD: 15.5 %
SODIUM SERPL-SCNC: 137 MMOL/L
TRIGL SERPL-MCNC: 275 MG/DL
TSH SERPL-ACNC: 2.91 UIU/ML
WBC # FLD AUTO: 9.46 K/UL

## 2020-07-20 NOTE — HISTORY OF PRESENT ILLNESS
[Spouse] : spouse [FreeTextEntry1] : comprehensive [de-identified] : Mrs. Parra is a 43 y/o female with PMH significant for HTN, pre-DM, HPV, Obesity, who presented to the clinic for CPE.\par \par Luis Enrique is subjectively doing well. She denies any concerning symptoms. However, she has noticed increasing daytime somnolence and tiredness over past weeks. She usually sleeps about 6 hours per night and remember waking up a couple of times a night. She reports snoring, yet not sure if she has apneic events. Due to the daytime somnolence, she takes nap for about 30 min every day. Denies trouble falling a sleep at night.  Denies menorrhagia or unusual bleeding episodes. \par \par Also she reports occasional paresthesia of bilateral hands, which resolves when she puts her hands down. She denies any pain, poikilothermia, weakness. It seems that there is no clear trigger nor any temporal patterns. She never had similar symptoms before, yet it is not getting worse, but still bothers her. No swelling in the joints or hands. \par \par PO intake: okay. tries to eat healthy vegetables, fish, chicken. trying to limit sugars. \par Exercise: walks 10 minutes everyday. but no scheduled exercise. Now somewhat limited as she is tired during the daytime.\par \par Medications .lisinopril and metoprolol\par allergy: NKA\par FH: father/mother - diabetes.\par SH: never smoked. no EtOH, no recreational drug use. \par \par  PHQ2 = 0

## 2020-07-20 NOTE — REVIEW OF SYSTEMS
[Recent Change In Weight] : ~T recent weight change [Fever] : no fever [Chills] : no chills [Night Sweats] : no night sweats [Discharge] : no discharge [Pain] : no pain [Vision Problems] : no vision problems [Itching] : no itching [Earache] : no earache [Nasal Discharge] : no nasal discharge [Chest Pain] : no chest pain [Palpitations] : no palpitations [Leg Claudication] : no leg claudication [Lower Ext Edema] : no lower extremity edema [Orthopnea] : no orthopnea [Paroxysmal Nocturnal Dyspnea] : no paroxysmal nocturnal dyspnea [Shortness Of Breath] : no shortness of breath [Wheezing] : no wheezing [Cough] : no cough [Dyspnea on Exertion] : no dyspnea on exertion [Abdominal Pain] : no abdominal pain [Vomiting] : no vomiting [Dysuria] : no dysuria [Hematuria] : no hematuria [Joint Pain] : no joint pain [Muscle Pain] : no muscle pain [Itching] : no itching [Skin Rash] : no skin rash [Headache] : no headache [Dizziness] : no dizziness [Fainting] : no fainting [Memory Loss] : no memory loss [Unsteady Walking] : no ataxia [Suicidal] : not suicidal [Insomnia] : no insomnia [Anxiety] : no anxiety [Depression] : no depression [Easy Bleeding] : no easy bleeding [Easy Bruising] : no easy bruising [FreeTextEntry2] : dayatime somnolence

## 2020-07-20 NOTE — ASSESSMENT
[FreeTextEntry1] : Mrs. Parra is a 45 y/o female with PMH significant for HTN, pre-DM, HPV, Obesity, who presented to the clinic for CPE.\par \par # HTN:\par - blood pressure currently well controlled on current regimen.\par - continue with Lisinopril and Metoprolol. medications renewed\par - discussed regarding weight loss benefiting the blood pressure control.\par - encouraged continuing low salt diet.\par \par # pre-DM\par - A1c today.\par - discussed regarding continuing lifestyle modification: healthy diet and exercise \par \par # Daytime somnolence\par - Polysomnography (sleep study) referral given as patient is morbidly obese and has hx of HTN and snoring.\par - will rule out anemia with CBC\par - rule out hepatic dysfunction or electorlyte derangement with CMP\par - TSH ordered to assess thyroid function.\par \par # HPV+\par - OBGYN referral given for follow up.\par \par # HCM\par - Mammogram ordered\par - PHQ2 = 0\par

## 2020-07-20 NOTE — HEALTH RISK ASSESSMENT
[No] : No [0] : 1) Little interest or pleasure doing things: Not at all (0) [No falls in past year] : Patient reported no falls in the past year [Fully functional (bathing, dressing, toileting, transferring, walking, feeding)] : Fully functional (bathing, dressing, toileting, transferring, walking, feeding) [1] : 2) Feeling down, depressed, or hopeless for several days (1) [Fully functional (using the telephone, shopping, preparing meals, housekeeping, doing laundry, using] : Fully functional and needs no help or supervision to perform IADLs (using the telephone, shopping, preparing meals, housekeeping, doing laundry, using transportation, managing medications and managing finances) [] : No [[id number]] : 0

## 2020-07-20 NOTE — PHYSICAL EXAM
[No Acute Distress] : no acute distress [Well Developed] : well developed [Well Nourished] : well nourished [Normal Sclera/Conjunctiva] : normal sclera/conjunctiva [Well-Appearing] : well-appearing [Normal Outer Ear/Nose] : the outer ears and nose were normal in appearance [EOMI] : extraocular movements intact [PERRL] : pupils equal round and reactive to light [No JVD] : no jugular venous distention [Normal Oropharynx] : the oropharynx was normal [No Lymphadenopathy] : no lymphadenopathy [Supple] : supple [Clear to Auscultation] : lungs were clear to auscultation bilaterally [No Accessory Muscle Use] : no accessory muscle use [No Respiratory Distress] : no respiratory distress  [Normal Rate] : normal rate  [Regular Rhythm] : with a regular rhythm [Normal S1, S2] : normal S1 and S2 [No Murmur] : no murmur heard [No Carotid Bruits] : no carotid bruits [Pedal Pulses Present] : the pedal pulses are present [No Edema] : there was no peripheral edema [Non-distended] : non-distended [Non Tender] : non-tender [Soft] : abdomen soft [No Masses] : no abdominal mass palpated [No HSM] : no HSM [Normal Posterior Cervical Nodes] : no posterior cervical lymphadenopathy [Normal Bowel Sounds] : normal bowel sounds [No CVA Tenderness] : no CVA  tenderness [Normal Anterior Cervical Nodes] : no anterior cervical lymphadenopathy [No Spinal Tenderness] : no spinal tenderness [No Rash] : no rash [Grossly Normal Strength/Tone] : grossly normal strength/tone [No Joint Swelling] : no joint swelling [Normal Gait] : normal gait [No Focal Deficits] : no focal deficits [Coordination Grossly Intact] : coordination grossly intact [Deep Tendon Reflexes (DTR)] : deep tendon reflexes were 2+ and symmetric [Normal Affect] : the affect was normal [Normal Insight/Judgement] : insight and judgment were intact [de-identified] : Obese [de-identified] : no palpable thyroid nodules or goiter.

## 2020-08-02 ENCOUNTER — LABORATORY RESULT (OUTPATIENT)
Age: 45
End: 2020-08-02

## 2020-08-03 ENCOUNTER — OUTPATIENT (OUTPATIENT)
Dept: OUTPATIENT SERVICES | Facility: HOSPITAL | Age: 45
LOS: 1 days | End: 2020-08-03
Payer: SELF-PAY

## 2020-08-03 ENCOUNTER — APPOINTMENT (OUTPATIENT)
Dept: OBGYN | Facility: CLINIC | Age: 45
End: 2020-08-03
Payer: COMMERCIAL

## 2020-08-03 VITALS — DIASTOLIC BLOOD PRESSURE: 90 MMHG | BODY MASS INDEX: 40.25 KG/M2 | SYSTOLIC BLOOD PRESSURE: 140 MMHG | WEIGHT: 213 LBS

## 2020-08-03 DIAGNOSIS — B96.89 ACUTE VAGINITIS: ICD-10-CM

## 2020-08-03 DIAGNOSIS — Z01.419 ENCOUNTER FOR GYNECOLOGICAL EXAMINATION (GENERAL) (ROUTINE) W/OUT ABNORMAL FINDINGS: ICD-10-CM

## 2020-08-03 DIAGNOSIS — N76.0 ACUTE VAGINITIS: ICD-10-CM

## 2020-08-03 PROCEDURE — 87625 HPV TYPES 16 & 18 ONLY: CPT

## 2020-08-03 PROCEDURE — 88175 CYTOPATH C/V AUTO FLUID REDO: CPT

## 2020-08-03 PROCEDURE — 88141 CYTOPATH C/V INTERPRET: CPT

## 2020-08-03 PROCEDURE — 87624 HPV HI-RISK TYP POOLED RSLT: CPT

## 2020-08-03 PROCEDURE — 87800 DETECT AGNT MULT DNA DIREC: CPT

## 2020-08-03 PROCEDURE — G0463: CPT

## 2020-08-03 PROCEDURE — 99396 PREV VISIT EST AGE 40-64: CPT | Mod: NC

## 2020-08-03 PROCEDURE — 87591 N.GONORRHOEAE DNA AMP PROB: CPT

## 2020-08-03 PROCEDURE — 87491 CHLMYD TRACH DNA AMP PROBE: CPT

## 2020-08-03 NOTE — HISTORY OF PRESENT ILLNESS
[___ Month(s) Ago] : [unfilled] month(s) ago [Fair] : being in fair health [Last Mammogram ___] : Last Mammogram was [unfilled] [Last Pap ___] : Last cervical pap smear was [unfilled] [Reproductive Age] : is of reproductive age [Approximately ___ (Month)] : the LMP was approximately [unfilled] month(s) ago [Normal Amount/Duration] : was of a normal amount and duration [Spotting Between  Menses] : no spotting between menses [Regular Cycle Intervals] : periods have been regular [Sexually Active] : is sexually active [Monogamous] : is monogamous [Contraception] : uses contraception [Tubal Ligation] : has had a tubal ligation

## 2020-08-03 NOTE — PHYSICAL EXAM
[Awake] : awake [Alert] : alert [Mass] : no breast mass [Acute Distress] : no acute distress [Axillary LAD] : no axillary lymphadenopathy [Nipple Discharge] : no nipple discharge [Tender] : non tender [Distended] : not distended [Soft] : soft [Oriented x3] : oriented to person, place, and time [H/Smegaly] : no hepatosplenomegaly [Flat Affect] : affect not flat [Depressed Mood] : not depressed [Labia Majora] : labia major [Normal] : uterus [Labia Minora] : labia minora [Discharge] : a  ~M vaginal discharge was present [Foul Smelling] : foul smelling [Scant] : scant [White] : white [No Bleeding] : there was no active vaginal bleeding [Thin] : thin [Pap Obtained] : a Pap smear was performed [Motion Tenderness] : there was no cervical motion tenderness [Uterine Adnexae] : were not tender and not enlarged

## 2020-08-04 LAB
C TRACH RRNA SPEC QL NAA+PROBE: SIGNIFICANT CHANGE UP
CANDIDA AB TITR SER: SIGNIFICANT CHANGE UP
G VAGINALIS DNA SPEC QL NAA+PROBE: DETECTED
HPV HIGH+LOW RISK DNA PNL CVX: DETECTED
N GONORRHOEA RRNA SPEC QL NAA+PROBE: SIGNIFICANT CHANGE UP
SPECIMEN SOURCE: SIGNIFICANT CHANGE UP
T VAGINALIS SPEC QL WET PREP: SIGNIFICANT CHANGE UP

## 2020-08-06 LAB
CYTOLOGY SPEC DOC CYTO: SIGNIFICANT CHANGE UP
HPV GENOTYPE 16: SIGNIFICANT CHANGE UP
HPV GENOTYPE 18/45: DETECTED

## 2020-08-07 LAB
FERRITIN SERPL-MCNC: 6 NG/ML
FOLATE SERPL-MCNC: >20 NG/ML
IRON SATN MFR SERPL: 7 %
IRON SERPL-MCNC: 36 UG/DL
TIBC SERPL-MCNC: 518 UG/DL
TRANSFERRIN SERPL-MCNC: 430 MG/DL
UIBC SERPL-MCNC: 482 UG/DL
VIT B12 SERPL-MCNC: 451 PG/ML

## 2020-08-12 ENCOUNTER — LABORATORY RESULT (OUTPATIENT)
Age: 45
End: 2020-08-12

## 2020-08-12 ENCOUNTER — APPOINTMENT (OUTPATIENT)
Dept: OBGYN | Facility: CLINIC | Age: 45
End: 2020-08-12
Payer: SELF-PAY

## 2020-08-12 ENCOUNTER — RESULT CHARGE (OUTPATIENT)
Age: 45
End: 2020-08-12

## 2020-08-12 ENCOUNTER — OUTPATIENT (OUTPATIENT)
Dept: OUTPATIENT SERVICES | Facility: HOSPITAL | Age: 45
LOS: 1 days | End: 2020-08-12
Payer: SELF-PAY

## 2020-08-12 DIAGNOSIS — N76.0 ACUTE VAGINITIS: ICD-10-CM

## 2020-08-12 DIAGNOSIS — Z87.898 PERSONAL HISTORY OF OTHER SPECIFIED CONDITIONS: ICD-10-CM

## 2020-08-12 DIAGNOSIS — Z01.419 ENCOUNTER FOR GYNECOLOGICAL EXAMINATION (GENERAL) (ROUTINE) WITHOUT ABNORMAL FINDINGS: ICD-10-CM

## 2020-08-12 PROCEDURE — 57452 EXAM OF CERVIX W/SCOPE: CPT | Mod: GC

## 2020-08-12 PROCEDURE — 57452 EXAM OF CERVIX W/SCOPE: CPT

## 2020-08-13 LAB
HCG UR QL: NEGATIVE
QUALITY CONTROL: YES

## 2020-08-13 NOTE — PROCEDURE
[HPV high risk] : PCR positive for high risk HPV [Colposcopy] : colposcopy [Patient] : patient [Risks] : risks [Alternatives] : alternatives [Benefits] : benefits [Consent Obtained] : written consent was obtained prior to the procedure [SCJ Fully Visulized] : the squamocolumnar junction was fully visualized [No Abnormalities] : no abnormalities seen [Tolerated Well] : the patient tolerated the procedure well [No Complications] : there were no complications [ECC Done] : Endocervical curettage was performed.  [Biopsies Taken: # ___] : no biopsies were taken of the cervix [FreeTextEntry9] : 18

## 2020-08-19 DIAGNOSIS — Z87.898 PERSONAL HISTORY OF OTHER SPECIFIED CONDITIONS: ICD-10-CM

## 2020-08-19 DIAGNOSIS — B97.7 PAPILLOMAVIRUS AS THE CAUSE OF DISEASES CLASSIFIED ELSEWHERE: ICD-10-CM

## 2020-09-08 ENCOUNTER — APPOINTMENT (OUTPATIENT)
Dept: MAMMOGRAPHY | Facility: IMAGING CENTER | Age: 45
End: 2020-09-08
Payer: COMMERCIAL

## 2020-09-08 ENCOUNTER — OUTPATIENT (OUTPATIENT)
Dept: OUTPATIENT SERVICES | Facility: HOSPITAL | Age: 45
LOS: 1 days | End: 2020-09-08
Payer: SELF-PAY

## 2020-09-08 ENCOUNTER — RESULT REVIEW (OUTPATIENT)
Age: 45
End: 2020-09-08

## 2020-09-08 DIAGNOSIS — Z00.8 ENCOUNTER FOR OTHER GENERAL EXAMINATION: ICD-10-CM

## 2020-09-08 PROCEDURE — 77063 BREAST TOMOSYNTHESIS BI: CPT | Mod: 26

## 2020-09-08 PROCEDURE — 77067 SCR MAMMO BI INCL CAD: CPT | Mod: 26

## 2020-09-08 PROCEDURE — 77067 SCR MAMMO BI INCL CAD: CPT

## 2020-09-08 PROCEDURE — 77063 BREAST TOMOSYNTHESIS BI: CPT

## 2020-09-21 ENCOUNTER — RESULT REVIEW (OUTPATIENT)
Age: 45
End: 2020-09-21

## 2020-09-21 ENCOUNTER — OUTPATIENT (OUTPATIENT)
Dept: OUTPATIENT SERVICES | Facility: HOSPITAL | Age: 45
LOS: 1 days | End: 2020-09-21
Payer: SELF-PAY

## 2020-09-21 ENCOUNTER — APPOINTMENT (OUTPATIENT)
Dept: ULTRASOUND IMAGING | Facility: IMAGING CENTER | Age: 45
End: 2020-09-21
Payer: COMMERCIAL

## 2020-09-21 ENCOUNTER — APPOINTMENT (OUTPATIENT)
Dept: MAMMOGRAPHY | Facility: IMAGING CENTER | Age: 45
End: 2020-09-21
Payer: COMMERCIAL

## 2020-09-21 DIAGNOSIS — Z00.8 ENCOUNTER FOR OTHER GENERAL EXAMINATION: ICD-10-CM

## 2020-09-21 PROCEDURE — 76641 ULTRASOUND BREAST COMPLETE: CPT

## 2020-09-21 PROCEDURE — G0279: CPT | Mod: 26

## 2020-09-21 PROCEDURE — 76641 ULTRASOUND BREAST COMPLETE: CPT | Mod: 26,RT

## 2020-09-21 PROCEDURE — 77065 DX MAMMO INCL CAD UNI: CPT

## 2020-09-21 PROCEDURE — 77065 DX MAMMO INCL CAD UNI: CPT | Mod: 26,RT

## 2020-09-21 PROCEDURE — G0279: CPT

## 2020-12-16 ENCOUNTER — NON-APPOINTMENT (OUTPATIENT)
Age: 45
End: 2020-12-16

## 2020-12-18 ENCOUNTER — APPOINTMENT (OUTPATIENT)
Dept: INTERNAL MEDICINE | Facility: CLINIC | Age: 45
End: 2020-12-18

## 2020-12-22 ENCOUNTER — NON-APPOINTMENT (OUTPATIENT)
Age: 45
End: 2020-12-22

## 2020-12-22 ENCOUNTER — LABORATORY RESULT (OUTPATIENT)
Age: 45
End: 2020-12-22

## 2020-12-22 ENCOUNTER — OUTPATIENT (OUTPATIENT)
Dept: OUTPATIENT SERVICES | Facility: HOSPITAL | Age: 45
LOS: 1 days | End: 2020-12-22
Payer: SELF-PAY

## 2020-12-22 ENCOUNTER — APPOINTMENT (OUTPATIENT)
Dept: INTERNAL MEDICINE | Facility: CLINIC | Age: 45
End: 2020-12-22

## 2020-12-22 VITALS
DIASTOLIC BLOOD PRESSURE: 90 MMHG | HEIGHT: 61 IN | HEART RATE: 98 BPM | WEIGHT: 208 LBS | OXYGEN SATURATION: 96 % | SYSTOLIC BLOOD PRESSURE: 158 MMHG | BODY MASS INDEX: 39.27 KG/M2

## 2020-12-22 DIAGNOSIS — I10 ESSENTIAL (PRIMARY) HYPERTENSION: ICD-10-CM

## 2020-12-22 DIAGNOSIS — N39.0 URINARY TRACT INFECTION, SITE NOT SPECIFIED: ICD-10-CM

## 2020-12-23 ENCOUNTER — LABORATORY RESULT (OUTPATIENT)
Age: 45
End: 2020-12-23

## 2020-12-23 PROBLEM — Z01.419 ENCOUNTER FOR GYNECOLOGICAL EXAMINATION WITH PAPANICOLAOU SMEAR OF CERVIX: Status: RESOLVED | Noted: 2020-08-03 | Resolved: 2020-12-23

## 2020-12-23 PROBLEM — N76.0 BACTERIAL VAGINOSIS: Status: RESOLVED | Noted: 2020-08-03 | Resolved: 2020-12-23

## 2020-12-23 LAB
BACTERIA # UR AUTO: ABNORMAL
EPI CELLS # UR: 6 /HPF — HIGH (ref 0–5)
HYALINE CASTS # UR AUTO: 0 /LPF — SIGNIFICANT CHANGE UP (ref 0–7)
RBC CASTS # UR COMP ASSIST: 1 /HPF — SIGNIFICANT CHANGE UP (ref 0–4)
WBC UR QL: 6 /HPF — HIGH (ref 0–5)

## 2020-12-23 PROCEDURE — 81001 URINALYSIS AUTO W/SCOPE: CPT

## 2020-12-23 PROCEDURE — G0463: CPT

## 2020-12-23 PROCEDURE — 87086 URINE CULTURE/COLONY COUNT: CPT

## 2020-12-24 LAB
CULTURE RESULTS: SIGNIFICANT CHANGE UP
SPECIMEN SOURCE: SIGNIFICANT CHANGE UP

## 2021-01-04 NOTE — HISTORY OF PRESENT ILLNESS
[FreeTextEntry8] : 45 y/o lady with PMH significant for HTN, pre-DM, HPV, Obesity, who presented to the clinic for discolored urine. \par \par Her symptoms started last week, initially w/dysuria, burning sensation, increased frequency and urgency. After 3 days, these symptoms resolved,  but she noticed it was yellow (normally clear). No blood in the urine, no foul odor. No vaginal discharge. She has never had these symptoms before. No fevers, chills, nausea/vomiting.

## 2021-01-04 NOTE — REVIEW OF SYSTEMS
[Dysuria] : dysuria [Frequency] : frequency [Negative] : Gastrointestinal [Vaginal Discharge] : no vaginal discharge [FreeTextEntry8] : yellow urine

## 2021-01-04 NOTE — PLAN
[FreeTextEntry1] : 45 y/o lady with PMH significant for HTN, pre-DM, HPV, Obesity, who presents today for discolored urine, following 3 days of UTI-like symptoms\par \par #UTI w/o hematuria\par -2/2 hygiene vs ?\par -initial sx have resolved, suspect the "discoloration" (yellow instead of clear urine), is 2/2 dehydration. Ordered UA w/reflex cx to make sure no infection still present.\par -counseled pt on importance of good hygiene and staying hydrated during the colder months. \par -f/u UA results.

## 2021-01-05 DIAGNOSIS — N39.0 URINARY TRACT INFECTION, SITE NOT SPECIFIED: ICD-10-CM

## 2021-01-14 ENCOUNTER — APPOINTMENT (OUTPATIENT)
Dept: INTERNAL MEDICINE | Facility: CLINIC | Age: 46
End: 2021-01-14

## 2021-01-19 ENCOUNTER — OUTPATIENT (OUTPATIENT)
Dept: OUTPATIENT SERVICES | Facility: HOSPITAL | Age: 46
LOS: 1 days | End: 2021-01-19
Payer: SELF-PAY

## 2021-01-19 ENCOUNTER — NON-APPOINTMENT (OUTPATIENT)
Age: 46
End: 2021-01-19

## 2021-01-19 ENCOUNTER — APPOINTMENT (OUTPATIENT)
Dept: INTERNAL MEDICINE | Facility: CLINIC | Age: 46
End: 2021-01-19

## 2021-01-19 VITALS
DIASTOLIC BLOOD PRESSURE: 100 MMHG | BODY MASS INDEX: 39.65 KG/M2 | SYSTOLIC BLOOD PRESSURE: 180 MMHG | WEIGHT: 210 LBS | HEART RATE: 88 BPM | OXYGEN SATURATION: 95 % | HEIGHT: 61 IN

## 2021-01-19 DIAGNOSIS — Z72.89 OTHER PROBLEMS RELATED TO LIFESTYLE: ICD-10-CM

## 2021-01-19 DIAGNOSIS — I10 ESSENTIAL (PRIMARY) HYPERTENSION: ICD-10-CM

## 2021-01-19 PROCEDURE — 90688 IIV4 VACCINE SPLT 0.5 ML IM: CPT

## 2021-01-19 PROCEDURE — G0008: CPT

## 2021-01-19 PROCEDURE — G0463: CPT | Mod: 25

## 2021-01-19 NOTE — HISTORY OF PRESENT ILLNESS
[Spouse] : spouse [de-identified] : Patient is a 45 year old woman with PMH pre-DM, HTN, HPV, obesity who presents for a follow-up visit for BP re-check. Patient's last BP reading on 12/22/2020 was 158/90. Today, patient notes a mild headache on and off recently, though denies any CP, SOB, dizziness. She states that her headache started 15 days ago, then resolved on its own, however resumed 3 days ago. She notes that she has been taking her blood pressure medications (metoprolol succinate 25 mg daily, lisinopril 10 mg daily) as indicated and has not skipped doses. \par \par Patient also notes R lateral, forearm pain. She notes that the pain worsens when she lifts heavy things. 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 She has not tried anything for the pain at this time.\par \par Patient notes eating a healthy diet, incorporating whole wheat products, vegetables, and chicken into her diet. She has omitted salts and sugars from her diet. She has been exercising daily, goes on 40 minute walks. Denies smoking, alcohol use.\par   [FreeTextEntry1] : BP f/u

## 2021-01-19 NOTE — PHYSICAL EXAM
[No Acute Distress] : no acute distress [Well Nourished] : well nourished [No Respiratory Distress] : no respiratory distress  [Clear to Auscultation] : lungs were clear to auscultation bilaterally [Normal Rate] : normal rate  [Regular Rhythm] : with a regular rhythm [Normal] : normal rate, regular rhythm, normal S1 and S2 and no murmur heard [No Edema] : there was no peripheral edema [Soft] : abdomen soft [Non Tender] : non-tender [No Rash] : no rash [No Focal Deficits] : no focal deficits [Normal Gait] : normal gait [Normal Affect] : the affect was normal [Normal Insight/Judgement] : insight and judgment were intact [No Joint Swelling] : no joint swelling [de-identified] : +R lateral epicondylar ttp. full ROM

## 2021-01-19 NOTE — PLAN
[FreeTextEntry1] : \par #HTN\par -Initial /100, repeat 160/100  Patient asymptomatic at visit.\par -Pt currently adherent with HTN medication regimen\par -Will increase Lisinopril 20 mg daily\par -Will recommend continuing Metoprolol succinate ER 25 daily\par -low salt diet and weight loss encouraged\par -Recommend repeat BP check and BMP in 2 weeks \par \par #R arm pain\par -pt with ttp to R lateral epicondyle, likely lateral epicondylitis \par -Would recommend stretching exercises, Tylenol PRN, and ice if needed\par -If continued or worsened, patient instructed to return to clinic for further evaluation, may need pain injection by ortho\par -Pt provided handout on lateral epicondylitis\par \par #Obesity/Pre-DM\par -Pt encouraged to continue healthy diet and daily exercise for lifestyle management \par \par #HCM\par -influenza vaccine given\par -up to date on other vaccinations \par \par Patient to RTC in 2 weeks for BP check and BMP (as increased dosage of lisinopril) or earlier if worsening of symptoms\par  \par

## 2021-01-19 NOTE — REVIEW OF SYSTEMS
[Joint Pain] : joint pain [Headache] : headache [Fever] : no fever [Earache] : no earache [Chest Pain] : no chest pain [Shortness Of Breath] : no shortness of breath [Abdominal Pain] : no abdominal pain [Nausea] : no nausea [Vomiting] : no vomiting [Skin Rash] : no skin rash [Dizziness] : no dizziness

## 2021-01-19 NOTE — ASSESSMENT
[FreeTextEntry1] : \par Patient is a 45 year old woman with PMH pre-DM, HTN, HPV, obesity who presents for a follow-up visit for BP re-check. /110, repeat manual /100.

## 2021-01-21 DIAGNOSIS — Z23 ENCOUNTER FOR IMMUNIZATION: ICD-10-CM

## 2021-01-21 DIAGNOSIS — R73.03 PREDIABETES: ICD-10-CM

## 2021-01-21 DIAGNOSIS — E66.01 MORBID (SEVERE) OBESITY DUE TO EXCESS CALORIES: ICD-10-CM

## 2021-01-21 DIAGNOSIS — Z72.89 OTHER PROBLEMS RELATED TO LIFESTYLE: ICD-10-CM

## 2021-01-25 ENCOUNTER — RX RENEWAL (OUTPATIENT)
Age: 46
End: 2021-01-25

## 2021-02-16 ENCOUNTER — OUTPATIENT (OUTPATIENT)
Dept: OUTPATIENT SERVICES | Facility: HOSPITAL | Age: 46
LOS: 1 days | End: 2021-02-16
Payer: SELF-PAY

## 2021-02-16 ENCOUNTER — APPOINTMENT (OUTPATIENT)
Dept: INTERNAL MEDICINE | Facility: CLINIC | Age: 46
End: 2021-02-16

## 2021-02-16 ENCOUNTER — LABORATORY RESULT (OUTPATIENT)
Age: 46
End: 2021-02-16

## 2021-02-16 VITALS
BODY MASS INDEX: 39.46 KG/M2 | OXYGEN SATURATION: 98 % | WEIGHT: 209 LBS | DIASTOLIC BLOOD PRESSURE: 80 MMHG | HEART RATE: 79 BPM | SYSTOLIC BLOOD PRESSURE: 120 MMHG | HEIGHT: 61 IN

## 2021-02-16 DIAGNOSIS — I10 ESSENTIAL (PRIMARY) HYPERTENSION: ICD-10-CM

## 2021-02-16 DIAGNOSIS — D50.9 IRON DEFICIENCY ANEMIA, UNSPECIFIED: ICD-10-CM

## 2021-02-16 PROCEDURE — 80053 COMPREHEN METABOLIC PANEL: CPT

## 2021-02-16 PROCEDURE — G0463: CPT

## 2021-02-16 PROCEDURE — 85027 COMPLETE CBC AUTOMATED: CPT

## 2021-02-16 PROCEDURE — 36415 COLL VENOUS BLD VENIPUNCTURE: CPT

## 2021-02-16 RX ORDER — METRONIDAZOLE 7.5 MG/G
0.75 GEL VAGINAL
Qty: 1 | Refills: 2 | Status: DISCONTINUED | COMMUNITY
Start: 2020-08-03 | End: 2021-02-16

## 2021-02-17 LAB
ALBUMIN SERPL ELPH-MCNC: 4.5 G/DL — SIGNIFICANT CHANGE UP (ref 3.3–5)
ALP SERPL-CCNC: 130 U/L — HIGH (ref 40–120)
ALT FLD-CCNC: 23 U/L — SIGNIFICANT CHANGE UP (ref 10–45)
ANION GAP SERPL CALC-SCNC: 13 MMOL/L — SIGNIFICANT CHANGE UP (ref 5–17)
AST SERPL-CCNC: 18 U/L — SIGNIFICANT CHANGE UP (ref 10–40)
BILIRUB SERPL-MCNC: <0.2 MG/DL — SIGNIFICANT CHANGE UP (ref 0.2–1.2)
BUN SERPL-MCNC: 16 MG/DL — SIGNIFICANT CHANGE UP (ref 7–23)
CALCIUM SERPL-MCNC: 9.5 MG/DL — SIGNIFICANT CHANGE UP (ref 8.4–10.5)
CHLORIDE SERPL-SCNC: 104 MMOL/L — SIGNIFICANT CHANGE UP (ref 96–108)
CO2 SERPL-SCNC: 22 MMOL/L — SIGNIFICANT CHANGE UP (ref 22–31)
CREAT SERPL-MCNC: 0.84 MG/DL — SIGNIFICANT CHANGE UP (ref 0.5–1.3)
GLUCOSE SERPL-MCNC: 88 MG/DL — SIGNIFICANT CHANGE UP (ref 70–99)
HCT VFR BLD CALC: 35.6 % — SIGNIFICANT CHANGE UP (ref 34.5–45)
HGB BLD-MCNC: 10.6 G/DL — LOW (ref 11.5–15.5)
MCHC RBC-ENTMCNC: 23.3 PG — LOW (ref 27–34)
MCHC RBC-ENTMCNC: 29.8 GM/DL — LOW (ref 32–36)
MCV RBC AUTO: 78.2 FL — LOW (ref 80–100)
PLATELET # BLD AUTO: 317 K/UL — SIGNIFICANT CHANGE UP (ref 150–400)
POTASSIUM SERPL-MCNC: 4.3 MMOL/L — SIGNIFICANT CHANGE UP (ref 3.5–5.3)
POTASSIUM SERPL-SCNC: 4.3 MMOL/L — SIGNIFICANT CHANGE UP (ref 3.5–5.3)
PROT SERPL-MCNC: 7.3 G/DL — SIGNIFICANT CHANGE UP (ref 6–8.3)
RBC # BLD: 4.55 M/UL — SIGNIFICANT CHANGE UP (ref 3.8–5.2)
RBC # FLD: 17.5 % — HIGH (ref 10.3–14.5)
SODIUM SERPL-SCNC: 139 MMOL/L — SIGNIFICANT CHANGE UP (ref 135–145)
WBC # BLD: 8.14 K/UL — SIGNIFICANT CHANGE UP (ref 3.8–10.5)
WBC # FLD AUTO: 8.14 K/UL — SIGNIFICANT CHANGE UP (ref 3.8–10.5)

## 2021-02-17 NOTE — PHYSICAL EXAM
[Normal Outer Ear/Nose] : the outer ears and nose were normal in appearance [Normal Oropharynx] : the oropharynx was normal [Supple] : supple [No Edema] : there was no peripheral edema [No Extremity Clubbing/Cyanosis] : no extremity clubbing/cyanosis [Normal] : no joint swelling and grossly normal strength and tone [No Acute Distress] : no acute distress [Well-Appearing] : well-appearing [Normal Sclera/Conjunctiva] : normal sclera/conjunctiva [No JVD] : no jugular venous distention [No Respiratory Distress] : no respiratory distress  [Clear to Auscultation] : lungs were clear to auscultation bilaterally [Normal Rate] : normal rate  [Regular Rhythm] : with a regular rhythm [Soft] : abdomen soft [Non Tender] : non-tender [No Rash] : no rash [No Skin Lesions] : no skin lesions [Coordination Grossly Intact] : coordination grossly intact [No Focal Deficits] : no focal deficits [Speech Grossly Normal] : speech grossly normal [Alert and Oriented x3] : oriented to person, place, and time

## 2021-02-17 NOTE — HISTORY OF PRESENT ILLNESS
[Spouse] : spouse [FreeTextEntry1] : Follow up for blood pressure [de-identified] : 45F w/ pre-DM, HTN, obesity and MITCH, presented for follow up for HTN.\par Patient's lisinopril was increased to 20 mg last visit. Since then, no dizziness, visual changes, headache, nausea, vomiting or syncope. \par ROS otherwise negative.\glenn Was also found to have MITCH in 07/20 w/ iron saturation 7%. Was prescribed ferrous sulfate, and didn't take (didn't know about the iron pills). Reported to have heavy period (heavy in first 2 days in a 3-day cycle), and no melena or hematochezia.

## 2021-02-17 NOTE — ASSESSMENT
[FreeTextEntry1] : 45F w/ pre-DM, HTN, obesity and MITCH, presented for follow up for HTN.\par \par HTN\par - 's/80's x 2 in the office today\par - C/w lisinopril 20 mg and Toprol 25 mg daily (unclear indication, was started in 2015)\par - Check BMP for Cr, if > 30% increase, will need closer follow up for repeat BMP (Cr 0.7-0.8's prior, 1.04 in 7/2020)\par - Asymptomatic\par \par MITCH\par - Iron sat 7% in 07/20\par - (+) heavy menses, no report of GIB\par - Refilled iron supplement 325mg TIW\par - Repeat CBC\par \par HCM\par - Mammo Birads-2 9/20\par - PAP 8/20: HPV (+), focal squamous metaplasia & inflammation; repeat in 1 year\par - HIV & HCV neg 11/14\par - PHQ neg; SBIRT neg\par - Flu 1/21\par - Tdap 2/19\par \par If Cr > 30% increase, will return in 1-2 weeks for repeat BMP; if Cr stable, may return in 3 month for f/u. \par \par Case d/w Dr. Gleason\par Sandra Ortiz, PGY-2

## 2021-02-18 DIAGNOSIS — D50.9 IRON DEFICIENCY ANEMIA, UNSPECIFIED: ICD-10-CM

## 2021-08-09 ENCOUNTER — APPOINTMENT (OUTPATIENT)
Dept: INTERNAL MEDICINE | Facility: CLINIC | Age: 46
End: 2021-08-09

## 2021-08-09 ENCOUNTER — LABORATORY RESULT (OUTPATIENT)
Age: 46
End: 2021-08-09

## 2021-08-09 ENCOUNTER — OUTPATIENT (OUTPATIENT)
Dept: OUTPATIENT SERVICES | Facility: HOSPITAL | Age: 46
LOS: 1 days | End: 2021-08-09
Payer: SELF-PAY

## 2021-08-09 VITALS
HEIGHT: 61 IN | WEIGHT: 203 LBS | OXYGEN SATURATION: 95 % | SYSTOLIC BLOOD PRESSURE: 122 MMHG | HEART RATE: 83 BPM | DIASTOLIC BLOOD PRESSURE: 80 MMHG | BODY MASS INDEX: 38.33 KG/M2

## 2021-08-09 DIAGNOSIS — D50.9 IRON DEFICIENCY ANEMIA, UNSPECIFIED: ICD-10-CM

## 2021-08-09 DIAGNOSIS — I10 ESSENTIAL (PRIMARY) HYPERTENSION: ICD-10-CM

## 2021-08-09 LAB — HBA1C MFR BLD HPLC: 6.1

## 2021-08-09 PROCEDURE — 83036 HEMOGLOBIN GLYCOSYLATED A1C: CPT

## 2021-08-09 PROCEDURE — 80061 LIPID PANEL: CPT

## 2021-08-09 PROCEDURE — 85027 COMPLETE CBC AUTOMATED: CPT

## 2021-08-09 PROCEDURE — 84466 ASSAY OF TRANSFERRIN: CPT

## 2021-08-09 PROCEDURE — 80053 COMPREHEN METABOLIC PANEL: CPT

## 2021-08-09 PROCEDURE — 83550 IRON BINDING TEST: CPT

## 2021-08-09 PROCEDURE — 82728 ASSAY OF FERRITIN: CPT

## 2021-08-09 PROCEDURE — G0463: CPT | Mod: 25

## 2021-08-09 PROCEDURE — 83540 ASSAY OF IRON: CPT

## 2021-08-09 RX ORDER — FERROUS ASPARTO GLYCINATE, FERROUS FUMARATE, ASCORBIC ACID, FOLIC ACID, CYANOCOBALAMIN, ZINC, AND INTRINSIC FACTOR 25; 50; 60; 750; 250; 10; 12; 100 MG/1; MG/1; MG/1; UG/1; UG/1; UG/1; MG/1; MG/1
CAPSULE ORAL DAILY
Qty: 60 | Refills: 0 | Status: COMPLETED | COMMUNITY
Start: 2021-08-09

## 2021-08-10 LAB
ALBUMIN SERPL ELPH-MCNC: 4.5 G/DL — SIGNIFICANT CHANGE UP (ref 3.3–5)
ALP SERPL-CCNC: 136 U/L — HIGH (ref 40–120)
ALT FLD-CCNC: 23 U/L — SIGNIFICANT CHANGE UP (ref 10–45)
ANION GAP SERPL CALC-SCNC: 14 MMOL/L — SIGNIFICANT CHANGE UP (ref 5–17)
AST SERPL-CCNC: 20 U/L — SIGNIFICANT CHANGE UP (ref 10–40)
BILIRUB SERPL-MCNC: 0.2 MG/DL — SIGNIFICANT CHANGE UP (ref 0.2–1.2)
BUN SERPL-MCNC: 19 MG/DL — SIGNIFICANT CHANGE UP (ref 7–23)
CALCIUM SERPL-MCNC: 9.8 MG/DL — SIGNIFICANT CHANGE UP (ref 8.4–10.5)
CHLORIDE SERPL-SCNC: 104 MMOL/L — SIGNIFICANT CHANGE UP (ref 96–108)
CHOLEST SERPL-MCNC: 192 MG/DL — SIGNIFICANT CHANGE UP
CO2 SERPL-SCNC: 23 MMOL/L — SIGNIFICANT CHANGE UP (ref 22–31)
CREAT SERPL-MCNC: 0.92 MG/DL — SIGNIFICANT CHANGE UP (ref 0.5–1.3)
FERRITIN SERPL-MCNC: 6 NG/ML — LOW (ref 15–150)
GLUCOSE SERPL-MCNC: 97 MG/DL — SIGNIFICANT CHANGE UP (ref 70–99)
HCT VFR BLD CALC: 36.5 % — SIGNIFICANT CHANGE UP (ref 34.5–45)
HDLC SERPL-MCNC: 45 MG/DL — LOW
HGB BLD-MCNC: 11.2 G/DL — LOW (ref 11.5–15.5)
IRON SATN MFR SERPL: 31 UG/DL — SIGNIFICANT CHANGE UP (ref 30–160)
IRON SATN MFR SERPL: 6 % — LOW (ref 14–50)
LIPID PNL WITH DIRECT LDL SERPL: 104 MG/DL — HIGH
MCHC RBC-ENTMCNC: 24.6 PG — LOW (ref 27–34)
MCHC RBC-ENTMCNC: 30.7 GM/DL — LOW (ref 32–36)
MCV RBC AUTO: 80 FL — SIGNIFICANT CHANGE UP (ref 80–100)
NON HDL CHOLESTEROL: 148 MG/DL — HIGH
PLATELET # BLD AUTO: 288 K/UL — SIGNIFICANT CHANGE UP (ref 150–400)
POTASSIUM SERPL-MCNC: 4.1 MMOL/L — SIGNIFICANT CHANGE UP (ref 3.5–5.3)
POTASSIUM SERPL-SCNC: 4.1 MMOL/L — SIGNIFICANT CHANGE UP (ref 3.5–5.3)
PROT SERPL-MCNC: 7.5 G/DL — SIGNIFICANT CHANGE UP (ref 6–8.3)
RBC # BLD: 4.56 M/UL — SIGNIFICANT CHANGE UP (ref 3.8–5.2)
RBC # FLD: 15.9 % — HIGH (ref 10.3–14.5)
SODIUM SERPL-SCNC: 141 MMOL/L — SIGNIFICANT CHANGE UP (ref 135–145)
TIBC SERPL-MCNC: 485 UG/DL — HIGH (ref 220–430)
TRANSFERRIN SERPL-MCNC: 399 MG/DL — HIGH (ref 200–360)
TRIGL SERPL-MCNC: 217 MG/DL — HIGH
UIBC SERPL-MCNC: 454 UG/DL — HIGH (ref 110–370)
WBC # BLD: 8.42 K/UL — SIGNIFICANT CHANGE UP (ref 3.8–10.5)
WBC # FLD AUTO: 8.42 K/UL — SIGNIFICANT CHANGE UP (ref 3.8–10.5)

## 2021-08-10 NOTE — PAST MEDICAL HISTORY
[Menstruating] : menstruating [Approximately ___] : the LMP was approximately [unfilled] [Irregular Cycle Intervals] : are  irregular

## 2021-08-11 DIAGNOSIS — R73.03 PREDIABETES: ICD-10-CM

## 2021-08-11 NOTE — PHYSICAL EXAM
[Well Developed] : well developed [Normal Verbal Skills] : the patient had normal verbal communication skills [Normal Sclera/Conjunctiva] : normal sclera/conjunctiva [Normal Outer Ear/Nose] : the outer ears and nose were normal in appearance [Normal Oropharynx] : the oropharynx was normal [Obese] : obese [Soft, Nontender] : the abdomen was soft and nontender [No HSM] : no hepatosplenomegaly noted [Normal] : affect was normal and insight and judgment were intact [Soft] : abdomen soft [Non Tender] : non-tender [Non-distended] : non-distended [No Masses] : no abdominal mass palpated [Normal Bowel Sounds] : normal bowel sounds [Coordination Grossly Intact] : coordination grossly intact [Speech Grossly Normal] : speech grossly normal [Normal Affect] : the affect was normal [de-identified] : varicose veins in b/l lower extremities

## 2021-08-11 NOTE — REVIEW OF SYSTEMS
[Fatigue] : fatigue [Vision Problems] : vision problems [Muscle Pain] : muscle pain [Headache] : headache [Pain] : no pain [Chest Pain] : no chest pain [Palpitations] : no palpitations [Shortness Of Breath] : no shortness of breath [Abdominal Pain] : no abdominal pain [Nausea] : no nausea [Constipation] : no constipation [Diarrhea] : diarrhea [Vomiting] : no vomiting [Dysuria] : no dysuria [Hematuria] : no hematuria [Frequency] : no frequency [Dizziness] : no dizziness

## 2021-08-11 NOTE — HISTORY OF PRESENT ILLNESS
[Friend] : friend [Other: ______] : provided by JENNIFER [FreeTextEntry1] : 6 month follow up [de-identified] : Rosi Parra is a 46F with a history of pre-DM, HTN, obesity, iron deficiency anemia who presents to the clinic for a 6 month follow up. She reports that she is doing well overall, does have some specific complaints, right leg has been hurting for 1 month, lateral side. She reports that it feels as if her leg is asleep, sometimes it is sharp, doesn't radiate anywhere. 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 Hasn't tried anything that provides relief. Doesn't wear tight pants. \par \par Also reports that she has a rash on her right ankle that is itchy. It has been present since her last visit in Feb 2021, tried to use hydrocortisone cream without relief.\par \par HTN: taking lisinopril 20 mg and toprol 25 mg daily. Has some dizziness at times when taking meds, but otherwise tolerating well. No headaches, chest pain, sob. Eats a varied diet, enjoys veggies and fish and chicken. Does like eating salt but doesn't eat a lot of it. Doesn't eat processed foods. No smoking, no alcohol use, no ilicit drugs.\par \par DM: has had increased flashers with blurry vision for the past month. happens daily, for 1-2 times a day. no floaters. no polyuria, no polydipsia. no numbness or tingling in feet.\par \par Anemia: tried to take iron pills from her last appointment but had stomach upset. LMP ~8/2. Lasted 3 days, with heavy menstrual flow (5-6 pads per day). Irregular periods, last period before this month was 3 months ago. Does feel like her energy level is low, and has daytime somnolence.

## 2021-08-11 NOTE — HEALTH RISK ASSESSMENT
[No] : In the past 12 months have you used drugs other than those required for medical reasons? No [] : No [de-identified] : lots of different foods, likes veggies and fish and chicken. likes salty foods.

## 2021-08-11 NOTE — ASSESSMENT
[FreeTextEntry1] : Rosi Parra is a 46F with a history of pre-DM, HTN, obesity, iron deficiency anemia who presents to the clinic for a 6 month follow up. \par \par #pre-DM: A1c 6.1, stable since 7/2020, without polyuria/polydipsia\par - does describe vision changes over past few months\par - ophthalmology referral\par - lipid profile\par - CMP\par - metformin, do suspect some degree of insulin resistance \par - metformin 1 tab daily x7days, then 1 tab twice daily\par \par #Lichen Simplex Chronicus: pruritic rash over anterior aspect of ankle, unrelieved by hydrocortisone cream\par - triamcinolone cream, apply sparingly up to TID to affected area\par - counseling on not to scratch affected area\par \par #HTN: stable\par - Office BP today 122/80, from 120/80 on 2/16/21, improved from 180/100 on initial visit on 1/19/21.\par - doesn't have a blood pressure cuff at home, not checking outside of office\par - diet is varied includes veggies, fish, chicken, but eats salt \par - CMP\par - c/w current regimen lisinopril and toprol\par - DASH diet counseling\par \par #Anemia\par - per patient, unable to tolerate ferrous sulfate tabs due to upset stomach\par - chromagen 1 tab daily\par - CBC with diff\par - iron studies, transferrin, ferritin, TIBC\par \par #HCM\par - lipid profile\par - CMP\par \par RTC in 5 weeks for DM follow up\par \par Case d/w Dr. Jackson\par \par Roseanne Amin\par Internal Medicine, PGY1

## 2021-10-25 ENCOUNTER — RX RENEWAL (OUTPATIENT)
Age: 46
End: 2021-10-25

## 2022-01-03 ENCOUNTER — APPOINTMENT (OUTPATIENT)
Dept: INTERNAL MEDICINE | Facility: CLINIC | Age: 47
End: 2022-01-03

## 2022-01-24 ENCOUNTER — RX RENEWAL (OUTPATIENT)
Age: 47
End: 2022-01-24

## 2022-01-26 ENCOUNTER — RX RENEWAL (OUTPATIENT)
Age: 47
End: 2022-01-26

## 2022-01-31 ENCOUNTER — APPOINTMENT (OUTPATIENT)
Dept: INTERNAL MEDICINE | Facility: CLINIC | Age: 47
End: 2022-01-31
Payer: COMMERCIAL

## 2022-01-31 ENCOUNTER — OUTPATIENT (OUTPATIENT)
Dept: OUTPATIENT SERVICES | Facility: HOSPITAL | Age: 47
LOS: 1 days | End: 2022-01-31
Payer: SELF-PAY

## 2022-01-31 DIAGNOSIS — M25.561 PAIN IN RIGHT KNEE: ICD-10-CM

## 2022-01-31 DIAGNOSIS — I10 ESSENTIAL (PRIMARY) HYPERTENSION: ICD-10-CM

## 2022-01-31 DIAGNOSIS — G57.11 MERALGIA PARESTHETICA, RIGHT LOWER LIMB: ICD-10-CM

## 2022-01-31 PROCEDURE — G0463: CPT

## 2022-01-31 PROCEDURE — ZZZZZ: CPT | Mod: GE

## 2022-02-01 PROBLEM — G57.11 MERALGIA PARESTHETICA OF RIGHT SIDE: Status: ACTIVE | Noted: 2022-02-01

## 2022-02-01 PROBLEM — M25.561 RIGHT KNEE PAIN: Status: ACTIVE | Noted: 2022-01-31

## 2022-02-01 NOTE — HISTORY OF PRESENT ILLNESS
[Spouse] : spouse [Patient Declined  Services] : - None: Patient declined  services [FreeTextEntry1] : CPE [FreeTextEntry3] : Patient declined  use and  interpreted.  [de-identified] : 47 y/o F w/ PMH pre-DM, obesity, HTN, obesity, MITCH presents for CPE. Overall the patient feels well. \par \par Patient states that she has 2 months of R knee pain. Endorses mild swelling and occasional warmth. States that she has bought a compression sleeve from the pharmacy which helps with the pain. The pain is worse in the morning and is usually gone by night time, however she states the pain is also worse with ambulation. The pain does not come in flares and has never been extreme. Denies alcohol use, significant red meat, or other dietary contributions related to gout. \par \par Patient also states rare dizziness. Not associated w/ standing up or twisting of head. No clear trigger. Usually happens at work and patient believe it may be stress related. \par \par Patient complains of mild vaginal white discoloration. She is unsure how long it has been present and has not seen an OB/Gyn recently. \par \par Patient does not exercise. Diet contains mostly chicken, vegetables, fish.\par \par

## 2022-02-01 NOTE — PHYSICAL EXAM
[Normal] : affect was normal and insight and judgment were intact [de-identified] : obese [de-identified] : mild R knee effusion, decreased sensation over R anterolateral thigh.

## 2022-02-01 NOTE — ASSESSMENT
[FreeTextEntry1] : #HCM\par referral for pap smear\par mammogram later this year\par referral for conoloscopy\par flu shot

## 2022-02-10 DIAGNOSIS — M25.561 PAIN IN RIGHT KNEE: ICD-10-CM

## 2022-02-10 DIAGNOSIS — E66.01 MORBID (SEVERE) OBESITY DUE TO EXCESS CALORIES: ICD-10-CM

## 2022-02-10 DIAGNOSIS — Z00.00 ENCOUNTER FOR GENERAL ADULT MEDICAL EXAMINATION WITHOUT ABNORMAL FINDINGS: ICD-10-CM

## 2022-02-10 DIAGNOSIS — Z87.898 PERSONAL HISTORY OF OTHER SPECIFIED CONDITIONS: ICD-10-CM

## 2022-02-10 DIAGNOSIS — L28.0 LICHEN SIMPLEX CHRONICUS: ICD-10-CM

## 2022-03-08 ENCOUNTER — OUTPATIENT (OUTPATIENT)
Dept: OUTPATIENT SERVICES | Facility: HOSPITAL | Age: 47
LOS: 1 days | End: 2022-03-08
Payer: SELF-PAY

## 2022-03-08 ENCOUNTER — LABORATORY RESULT (OUTPATIENT)
Age: 47
End: 2022-03-08

## 2022-03-08 ENCOUNTER — APPOINTMENT (OUTPATIENT)
Dept: OBGYN | Facility: CLINIC | Age: 47
End: 2022-03-08
Payer: COMMERCIAL

## 2022-03-08 VITALS — DIASTOLIC BLOOD PRESSURE: 98 MMHG | WEIGHT: 199 LBS | SYSTOLIC BLOOD PRESSURE: 140 MMHG | BODY MASS INDEX: 37.6 KG/M2

## 2022-03-08 DIAGNOSIS — Z01.419 ENCOUNTER FOR GYNECOLOGICAL EXAMINATION (GENERAL) (ROUTINE) W/OUT ABNORMAL FINDINGS: ICD-10-CM

## 2022-03-08 DIAGNOSIS — Z87.898 PERSONAL HISTORY OF OTHER SPECIFIED CONDITIONS: ICD-10-CM

## 2022-03-08 DIAGNOSIS — N76.0 ACUTE VAGINITIS: ICD-10-CM

## 2022-03-08 PROCEDURE — 87591 N.GONORRHOEAE DNA AMP PROB: CPT

## 2022-03-08 PROCEDURE — 87624 HPV HI-RISK TYP POOLED RSLT: CPT

## 2022-03-08 PROCEDURE — G0463: CPT

## 2022-03-08 PROCEDURE — 87625 HPV TYPES 16 & 18 ONLY: CPT

## 2022-03-08 PROCEDURE — 99396 PREV VISIT EST AGE 40-64: CPT

## 2022-03-08 PROCEDURE — 87491 CHLMYD TRACH DNA AMP PROBE: CPT

## 2022-03-08 NOTE — HISTORY OF PRESENT ILLNESS
[Definite:  ___ (Date)] : the last menstrual period was [unfilled] [Normal Amount/Duration] : was of a normal amount and duration [Spotting Between  Menses] : no spotting between menses [Regular Cycle Intervals] : periods have been regular [Frequency: Q ___ days] : menstrual periods occur approximately every [unfilled] days [Sexually Active] : is sexually active [Monogamous] : is monogamous [Contraception] : uses contraception [Tubal Ligation] : has had a tubal ligation

## 2022-03-09 LAB
C TRACH RRNA SPEC QL NAA+PROBE: SIGNIFICANT CHANGE UP
HPV GENOTYPE 16: SIGNIFICANT CHANGE UP
HPV GENOTYPE 18/45: DETECTED
HPV HIGH+LOW RISK DNA PNL CVX: DETECTED
N GONORRHOEA RRNA SPEC QL NAA+PROBE: SIGNIFICANT CHANGE UP
SPECIMEN SOURCE: SIGNIFICANT CHANGE UP

## 2022-03-14 LAB — CYTOLOGY SPEC DOC CYTO: SIGNIFICANT CHANGE UP

## 2022-03-17 DIAGNOSIS — Z87.898 PERSONAL HISTORY OF OTHER SPECIFIED CONDITIONS: ICD-10-CM

## 2022-03-17 DIAGNOSIS — N90.89 OTHER SPECIFIED NONINFLAMMATORY DISORDERS OF VULVA AND PERINEUM: ICD-10-CM

## 2022-03-17 DIAGNOSIS — Z01.419 ENCOUNTER FOR GYNECOLOGICAL EXAMINATION (GENERAL) (ROUTINE) WITHOUT ABNORMAL FINDINGS: ICD-10-CM

## 2022-05-04 ENCOUNTER — OUTPATIENT (OUTPATIENT)
Dept: OUTPATIENT SERVICES | Facility: HOSPITAL | Age: 47
LOS: 1 days | End: 2022-05-04
Payer: SELF-PAY

## 2022-05-04 ENCOUNTER — LABORATORY RESULT (OUTPATIENT)
Age: 47
End: 2022-05-04

## 2022-05-04 ENCOUNTER — APPOINTMENT (OUTPATIENT)
Dept: OBGYN | Facility: CLINIC | Age: 47
End: 2022-05-04
Payer: COMMERCIAL

## 2022-05-04 DIAGNOSIS — N76.0 ACUTE VAGINITIS: ICD-10-CM

## 2022-05-04 DIAGNOSIS — B97.7 PAPILLOMAVIRUS AS THE CAUSE OF DISEASES CLASSIFIED ELSEWHERE: ICD-10-CM

## 2022-05-04 PROCEDURE — 57456 ENDOCERV CURETTAGE W/SCOPE: CPT | Mod: NC

## 2022-05-04 PROCEDURE — G0463: CPT

## 2022-05-04 PROCEDURE — 57456 ENDOCERV CURETTAGE W/SCOPE: CPT

## 2022-05-04 NOTE — PROCEDURE
[Colposcopy] : Colposcopy  [Risks] : risks [Benefits] : benefits [Alternatives] : alternatives [Patient] : patient [Infection] : infection [Bleeding] : bleeding [Allergic Reaction] : allergic reaction [Abnormal Pap] : abnormal pap [HPV High Risk] : HPV high risk [No Premedication] : no premedication [Colposcopy Adequate] : colposcopy adequate [SCI Fully Visualized] : SCI fully visualized [ECC Performed] : ECC performed [No Abnormalities] : no abnormalities [Hemostasis Obtained] : Hemostasis obtained [Tolerated Well] : the patient tolerated the procedure well [Biopsy] : biopsy not taken [de-identified] : +HPV 18/45 [de-identified] : Cervix grasped with allis clamp and brought into view within speculum. No gross abnormalities visualized. SCJ seen in its entirety. Cervix painted with acetic acid. No abnormalities performed. ECC performed, minimal bleeding.  [de-identified] : 45yo P6 w/ hx of HPV 18/45 and ASCUS (2019) s/p colpo/ECC (2020 - benign) presenting for repeat colpo due to +HPV 18/45. No abnormalities seen and biopsies not taken. ECC performed. Will send to path and follow up results. Patient instructed to follow up in 1 year for repeat pap/HPV testing. \par \par W/ Dr. Willis\par RFranthierry PGY3

## 2022-05-10 DIAGNOSIS — B97.7 PAPILLOMAVIRUS AS THE CAUSE OF DISEASES CLASSIFIED ELSEWHERE: ICD-10-CM

## 2022-05-18 ENCOUNTER — NON-APPOINTMENT (OUTPATIENT)
Age: 47
End: 2022-05-18

## 2022-08-30 ENCOUNTER — OUTPATIENT (OUTPATIENT)
Dept: OUTPATIENT SERVICES | Facility: HOSPITAL | Age: 47
LOS: 1 days | End: 2022-08-30
Payer: SELF-PAY

## 2022-08-30 ENCOUNTER — APPOINTMENT (OUTPATIENT)
Dept: GASTROENTEROLOGY | Facility: HOSPITAL | Age: 47
End: 2022-08-30

## 2022-08-30 VITALS
RESPIRATION RATE: 14 BRPM | DIASTOLIC BLOOD PRESSURE: 84 MMHG | WEIGHT: 200 LBS | TEMPERATURE: 97 F | SYSTOLIC BLOOD PRESSURE: 155 MMHG | HEART RATE: 69 BPM | BODY MASS INDEX: 37.76 KG/M2 | HEIGHT: 61 IN

## 2022-08-30 DIAGNOSIS — E66.01 MORBID (SEVERE) OBESITY DUE TO EXCESS CALORIES: ICD-10-CM

## 2022-08-30 DIAGNOSIS — D50.9 IRON DEFICIENCY ANEMIA, UNSPECIFIED: ICD-10-CM

## 2022-08-30 DIAGNOSIS — K31.9 DISEASE OF STOMACH AND DUODENUM, UNSPECIFIED: ICD-10-CM

## 2022-08-30 PROCEDURE — ZZZZZ: CPT | Mod: GC

## 2022-08-30 PROCEDURE — G0463: CPT

## 2022-08-30 NOTE — ASSESSMENT
[FreeTextEntry1] : # MITCH: hx of MITCH in the absence of overt GI bleeding\par # Obesity\par \par Recommendations:\par -encouraged and counseled on lifestyle modifications, dietary changes, and regular exercise\par -Plan to schedule patient for endoscopy and colonoscopy for MITCH\par -Counseled patient at length regarding instructions leading up to procedure, indication for procedure, and risks of the procedure, which include but not limited to infection, bleeding, or perforation.  Patient acknowledges risks/benefits and amenable to plan.\par \par Edgar Beverly\par GI/Hepatology Fellow\par

## 2022-08-30 NOTE — PHYSICAL EXAM
[General Appearance - Alert] : alert [General Appearance - In No Acute Distress] : in no acute distress [Sclera] : the sclera and conjunctiva were normal [Extraocular Movements] : extraocular movements were intact [Outer Ear] : the ears and nose were normal in appearance [Hearing Threshold Finger Rub Not McLennan] : hearing was normal [Neck Appearance] : the appearance of the neck was normal [] : no respiratory distress [Exaggerated Use Of Accessory Muscles For Inspiration] : no accessory muscle use [Heart Rate And Rhythm] : heart rate was normal and rhythm regular [Heart Sounds] : normal S1 and S2 [Abdomen Soft] : soft [Abdomen Tenderness] : non-tender [Abnormal Walk] : normal gait [Involuntary Movements] : no involuntary movements were seen [Skin Color & Pigmentation] : normal skin color and pigmentation [Skin Lesions] : no skin lesions [Affect] : the affect was normal [Mood] : the mood was normal [Oriented To Time, Place, And Person] : oriented to person, place, and time

## 2022-08-30 NOTE — HISTORY OF PRESENT ILLNESS
[Wt Loss ___ Lbs] : no recent weight loss [de-identified] : SUMEET ROCK is a 47 year year old female who presents to GI clinic for initial evaluation.\par \par Patient referred to GI "for a colonoscopy."  Patient has history of MITCH based on CBC and iron studies with ferritin 6 on labs from August 2021.  She states she does not take iron supplementation.  She states she will go up to 3 months without a menstrual period and denies heavy menstrual bleeding.  Otherwise, patient denies fevers, chills, weight loss, dysphagia, odynophagia, early satiety, poor oral intake, abdominal pain, nausea, vomiting, diarrhea, melena, hematemesis, hematochezia, change in stool caliber, or family history of GI-related cancers.\par

## 2022-09-10 NOTE — ED CDU PROVIDER INITIAL DAY NOTE - OBSERVATION MONITORING PLAN
ED Record Reviewed Protocol failed or has No Protocol, please review  Requested Prescriptions   Pending Prescriptions Disp Refills    LISINOPRIL 5 MG Oral Tab [Pharmacy Med Name: LISINOPRIL 5 MG TABLET] 90 tablet 1     Sig: TAKE 1 TABLET (5 MG TOTAL) BY MOUTH DAILY. Hypertensive Medications Protocol Failed - 9/10/2022 12:20 AM        Failed - CMP or BMP in past 6 months     No results found for this or any previous visit (from the past 4392 hour(s)).               Failed - In person appointment or virtual visit in the past 6 months       Recent Outpatient Visits              1 month ago Pain in both [address] [place] [address], [address], [address] [place], [place]    Office Visit    4 months ago Left foot pain    Rony Matute Ruston, APRN    Office Visit    10 months ago Mid back pain on left side    [facility]    [facility] Visit    10 months ago Screening for colorectal cancer    [address] GI PROCEDURE    Nurse Only    10 months ago Type 2 diabetes mellitus without complication, with long-term current use of insulin [facility]    [email or web address] JUVENCIO Negrete    Office Visit     Future Appointments         Provider Department Appt Notes    In 1 month [facility] Follow up    In 2 months Mukul Gaffney MD [facility], [email or web address], [address], [address] [facility] Annual visit               Passed - In person appointment in the past 12 or next 3 months       Recent Outpatient Visits              1 month ago Pain in both [address] [place] [address], [address], [address] [facility]    Office Visit    4 months ago Left foot pain    [facility], [email or web address], [address] Ruston, APRN    Office Visit    10 months ago Mid back pain on left side    [facility]    Office Visit    10 months ago Screening for colorectal cancer    [address] [facility] [place] GI PROCEDURE    Nurse Only    10 months ago Type 2 diabetes mellitus without complication, with long-term current use of insulin [facility])    [facility] Endocrinology JUVENCIO Jeffries    Office Visit     Future Appointments         Provider Department Appt Notes    In 1 month Liz Jeffries [facility] [place] [facility] Follow up    In 2 months Candelaria Fortune MD [place] [facility], [address] [facility] Annual visit               Passed - Last BP reading less than 140/90     BP Readings from Last 1 Encounters:  05/10/22 : 126/87                Passed - GFR > 50     No results found for: [facility]                 METFORMIN  MG Oral Tablet 24 Hr [Pharmacy Med Name: METFORMIN HCL  MG TABLET] 360 tablet 1     Si S Gentry Cazares         Diabetes Medication Protocol Failed - 9/10/2022 12:20 AM        Failed - Last A1C < 7.5 and within past 6 months     Lab Results   Component Value Date    A1C 6.4 (A) 2021               Failed - GFR in the past 12 months        Passed - In person appointment or virtual visit in the past 6 mos or appointment in next 3 mos       Recent Outpatient Visits              1 month ago Pain in both [address] [facility], [address], [address] [place] [address]    Office Visit    4 months ago Left foot pain    [facility], [address], JUVENCIO Galeas    Office Visit    10 months ago Mid back pain on left side    Saundra Li Austin, MD    Office Visit    10 months ago Screening for colorectal cancer    [address] [facility] [place] [facility] PROCEDURE    Nurse Only    10 months ago Type 2 diabetes mellitus without complication, with long-term current use of insulin [facility])    [facility], [facility] Endocrinology JUVENCIO Jeffries    Office Visit     Future Appointments         Provider Department Appt Notes    In 1 month Liz Jeffries Sim [place] [facility] Follow up    In 2 months Luis Fernando Davis MD [address] [facility] Annual visit               Passed - GFR > 50     [facility]: [facility]                Signed Prescriptions Disp Refills    ATORVASTATIN 40 MG Oral Tab 90 tablet 1     Sig: TAKE 1 TABLET BY MOUTH EVERY DAY AT NIGHT        Cholesterol Medication Protocol Passed - 9/10/2022 12:20 AM        Passed - ALT in past 12 months        Passed - LDL in past 12 months        Passed - Last ALT < 80       Lab Results   Component Value Date    ALT 29 11/20/2021             Passed - Last LDL < 130     Lab Results   Component Value Date    LDL 74 11/20/2021               Passed - In person appointment or virtual visit in the past 12 mos or appointment in next 3 mos       Recent Outpatient Visits              1 month ago Pain in both [address]37 [facility], [address], [facility] [place], [place]    Office Visit    4 months ago Left foot pain    [address], [address] Ruston, APRN    Office Visit    10 months ago Mid back pain on left side    Saundra García Austin, MD    Office Visit    10 months ago Screening for colorectal cancer    [address] GI PROCEDURE    Nurse Only    10 months ago Type 2 diabetes mellitus without complication, with long-term current use of insulin [facility])    [address] Endocrinology JUVENCIO Portillo    Office Visit     Future Appointments         Provider Department Appt Notes    In 1 month [facility] Follow up    In 2 months Luis Fernando Davis MD [address] Benjamin Borjas Annual visit                   Future Appointments         Provider Department Appt Notes    In 1 month [facility] Follow up    In 2 months Luis Fernando Davis MD [address] [facility] Annual visit          Recent Outpatient Visits              1 month ago Pain in both [address]37 [facility], [address], [facility]7 [facility], [place], [place]    Office Visit    4 months ago Left foot pain    Rony Crawford Ruston, APRN    Office Visit    10 months ago Mid back pain on left side    [facility], MD Rubén    Office Visit    10 months ago Screening for colorectal cancer    [facility] [employer] GI PROCEDURE    Nurse Only    10 months ago Type 2 diabetes mellitus without complication, with long-term current use of insulin [facility])    [facility] [place], [facility] Endocrinology JUVENCIO Shearer Asa    Office Visit

## 2022-10-10 ENCOUNTER — OUTPATIENT (OUTPATIENT)
Dept: OUTPATIENT SERVICES | Facility: HOSPITAL | Age: 47
LOS: 1 days | End: 2022-10-10
Payer: SELF-PAY

## 2022-10-10 DIAGNOSIS — Z11.52 ENCOUNTER FOR SCREENING FOR COVID-19: ICD-10-CM

## 2022-10-10 LAB — SARS-COV-2 RNA SPEC QL NAA+PROBE: SIGNIFICANT CHANGE UP

## 2022-10-10 PROCEDURE — U0005: CPT

## 2022-10-10 PROCEDURE — U0003: CPT

## 2022-10-10 PROCEDURE — C9803: CPT

## 2022-10-12 ENCOUNTER — TRANSCRIPTION ENCOUNTER (OUTPATIENT)
Age: 47
End: 2022-10-12

## 2022-10-12 ENCOUNTER — RESULT REVIEW (OUTPATIENT)
Age: 47
End: 2022-10-12

## 2022-10-12 ENCOUNTER — OUTPATIENT (OUTPATIENT)
Dept: OUTPATIENT SERVICES | Facility: HOSPITAL | Age: 47
LOS: 1 days | End: 2022-10-12
Payer: SELF-PAY

## 2022-10-12 VITALS
RESPIRATION RATE: 18 BRPM | DIASTOLIC BLOOD PRESSURE: 52 MMHG | OXYGEN SATURATION: 96 % | HEART RATE: 73 BPM | SYSTOLIC BLOOD PRESSURE: 108 MMHG

## 2022-10-12 VITALS
SYSTOLIC BLOOD PRESSURE: 175 MMHG | TEMPERATURE: 98 F | DIASTOLIC BLOOD PRESSURE: 85 MMHG | HEART RATE: 103 BPM | RESPIRATION RATE: 20 BRPM | OXYGEN SATURATION: 100 % | WEIGHT: 199.96 LBS | HEIGHT: 61 IN

## 2022-10-12 DIAGNOSIS — D50.9 IRON DEFICIENCY ANEMIA, UNSPECIFIED: ICD-10-CM

## 2022-10-12 PROCEDURE — 43239 EGD BIOPSY SINGLE/MULTIPLE: CPT | Mod: GC,59

## 2022-10-12 PROCEDURE — 43239 EGD BIOPSY SINGLE/MULTIPLE: CPT

## 2022-10-12 PROCEDURE — 88305 TISSUE EXAM BY PATHOLOGIST: CPT | Mod: 26

## 2022-10-12 PROCEDURE — 45380 COLONOSCOPY AND BIOPSY: CPT | Mod: 59,GC

## 2022-10-12 PROCEDURE — 88341 IMHCHEM/IMCYTCHM EA ADD ANTB: CPT

## 2022-10-12 PROCEDURE — 88342 IMHCHEM/IMCYTCHM 1ST ANTB: CPT | Mod: 26

## 2022-10-12 PROCEDURE — 45385 COLONOSCOPY W/LESION REMOVAL: CPT

## 2022-10-12 PROCEDURE — 45380 COLONOSCOPY AND BIOPSY: CPT | Mod: XS

## 2022-10-12 PROCEDURE — 88305 TISSUE EXAM BY PATHOLOGIST: CPT

## 2022-10-12 PROCEDURE — 45385 COLONOSCOPY W/LESION REMOVAL: CPT | Mod: GC

## 2022-10-12 DEVICE — NET RETRV ROT ROTH 2.5MMX230CM: Type: IMPLANTABLE DEVICE | Status: FUNCTIONAL

## 2022-10-12 RX ORDER — SODIUM CHLORIDE 9 MG/ML
500 INJECTION INTRAMUSCULAR; INTRAVENOUS; SUBCUTANEOUS
Refills: 0 | Status: DISCONTINUED | OUTPATIENT
Start: 2022-10-12 | End: 2022-10-26

## 2022-10-12 RX ORDER — LISINOPRIL 2.5 MG/1
1 TABLET ORAL
Qty: 0 | Refills: 0 | DISCHARGE

## 2022-10-12 NOTE — PRE PROCEDURE NOTE - PRE PROCEDURE EVALUATION
Attending Physician:     Daniel                       Procedure:  EGD/colon    Indication for Procedure: Iron deficiency anemia  ________________________________________________________  PAST MEDICAL & SURGICAL HISTORY:  Hyperlipidemia      Hypertension      No significant past surgical history        ALLERGIES:  No Known Allergies    HOME MEDICATIONS:  lisinopril 20 mg oral tablet: 1 tab(s) orally once a day  Metoprolol Succinate ER 25 mg oral tablet, extended release: 1 tab(s) orally once a day    AICD/PPM: [ ] yes   [x] no    PERTINENT LAB DATA:                      PHYSICAL EXAMINATION:    Height (cm): 154.9  Weight (kg): 90.7  BMI (kg/m2): 37.8  BSA (m2): 1.89T(C): 36.4  HR: 103  BP: 175/85  RR: 20  SpO2: 100%    Constitutional: NAD    Neck:  No JVD  Respiratory: CTAB/L  Cardiovascular: S1 and S2  Gastrointestinal: BS+, soft, NT/ND  Extremities: No peripheral edema  Neurological: A/O x 3, no focal deficits        COMMENTS:    The patient is a suitable candidate for the planned procedure unless box checked [ ]  No, explain:

## 2022-10-12 NOTE — ASU PATIENT PROFILE, ADULT - FALL HARM RISK - UNIVERSAL INTERVENTIONS
Bed in lowest position, wheels locked, appropriate side rails in place/Call bell, personal items and telephone in reach/Instruct patient to call for assistance before getting out of bed or chair/Non-slip footwear when patient is out of bed/[place] to call system/Physically safe environment - no spills, clutter or unnecessary equipment/Purposeful Proactive Rounding/Room/bathroom lighting operational, light cord in reach

## 2022-10-12 NOTE — ASU DISCHARGE PLAN (ADULT/PEDIATRIC) - NS MD DC FALL RISK RISK
For information on Fall & Injury Prevention, visit: [email or web address] OR  [email or web address] OR  https://www.cdc.gov/steadi/patient.html

## 2022-10-14 LAB — SURGICAL PATHOLOGY STUDY: SIGNIFICANT CHANGE UP

## 2022-11-16 ENCOUNTER — RESULT REVIEW (OUTPATIENT)
Age: 47
End: 2022-11-16

## 2022-11-16 ENCOUNTER — APPOINTMENT (OUTPATIENT)
Dept: INTERNAL MEDICINE | Facility: CLINIC | Age: 47
End: 2022-11-16

## 2022-11-16 ENCOUNTER — OUTPATIENT (OUTPATIENT)
Dept: OUTPATIENT SERVICES | Facility: HOSPITAL | Age: 47
LOS: 1 days | End: 2022-11-16
Payer: SELF-PAY

## 2022-11-16 VITALS
OXYGEN SATURATION: 95 % | SYSTOLIC BLOOD PRESSURE: 134 MMHG | DIASTOLIC BLOOD PRESSURE: 70 MMHG | HEIGHT: 61 IN | HEART RATE: 70 BPM | BODY MASS INDEX: 38.14 KG/M2 | WEIGHT: 202 LBS

## 2022-11-16 DIAGNOSIS — N89.8 OTHER SPECIFIED NONINFLAMMATORY DISORDERS OF VAGINA: ICD-10-CM

## 2022-11-16 DIAGNOSIS — I10 ESSENTIAL (PRIMARY) HYPERTENSION: ICD-10-CM

## 2022-11-16 DIAGNOSIS — K29.60 OTHER GASTRITIS W/OUT BLEEDING: ICD-10-CM

## 2022-11-16 DIAGNOSIS — E66.01 MORBID (SEVERE) OBESITY DUE TO EXCESS CALORIES: ICD-10-CM

## 2022-11-16 DIAGNOSIS — R74.8 ABNORMAL LEVELS OF OTHER SERUM ENZYMES: ICD-10-CM

## 2022-11-16 PROCEDURE — G0463: CPT

## 2022-11-16 PROCEDURE — 87389 HIV-1 AG W/HIV-1&-2 AB AG IA: CPT

## 2022-11-16 PROCEDURE — 86364 TISS TRNSGLTMNASE EA IG CLAS: CPT

## 2022-11-16 PROCEDURE — ZZZZZ: CPT

## 2022-11-16 PROCEDURE — 83036 HEMOGLOBIN GLYCOSYLATED A1C: CPT

## 2022-11-16 PROCEDURE — 80053 COMPREHEN METABOLIC PANEL: CPT

## 2022-11-16 PROCEDURE — 85025 COMPLETE CBC W/AUTO DIFF WBC: CPT

## 2022-11-18 DIAGNOSIS — K29.60 OTHER GASTRITIS WITHOUT BLEEDING: ICD-10-CM

## 2022-11-18 LAB
ALBUMIN SERPL ELPH-MCNC: 4.3 G/DL
ALP BLD-CCNC: 140 U/L
ALT SERPL-CCNC: 33 U/L
ANION GAP SERPL CALC-SCNC: 11 MMOL/L
AST SERPL-CCNC: 22 U/L
BASOPHILS # BLD AUTO: 0.04 K/UL
BASOPHILS NFR BLD AUTO: 0.4 %
BILIRUB SERPL-MCNC: <0.2 MG/DL
BUN SERPL-MCNC: 17 MG/DL
CALCIUM SERPL-MCNC: 9.5 MG/DL
CHLORIDE SERPL-SCNC: 103 MMOL/L
CO2 SERPL-SCNC: 26 MMOL/L
CREAT SERPL-MCNC: 1.14 MG/DL
EGFR: 60 ML/MIN/1.73M2
EOSINOPHIL # BLD AUTO: 0.21 K/UL
EOSINOPHIL NFR BLD AUTO: 2.2 %
ESTIMATED AVERAGE GLUCOSE: 128 MG/DL
GLUCOSE SERPL-MCNC: 95 MG/DL
HBA1C MFR BLD HPLC: 6.1 %
HCT VFR BLD CALC: 36.6 %
HGB BLD-MCNC: 11.5 G/DL
HIV1+2 AB SPEC QL IA.RAPID: NONREACTIVE
IMM GRANULOCYTES NFR BLD AUTO: 0.5 %
LYMPHOCYTES # BLD AUTO: 3.08 K/UL
LYMPHOCYTES NFR BLD AUTO: 32.4 %
MAN DIFF?: NORMAL
MCHC RBC-ENTMCNC: 25.1 PG
MCHC RBC-ENTMCNC: 31.4 GM/DL
MCV RBC AUTO: 79.9 FL
MONOCYTES # BLD AUTO: 0.75 K/UL
MONOCYTES NFR BLD AUTO: 7.9 %
NEUTROPHILS # BLD AUTO: 5.37 K/UL
NEUTROPHILS NFR BLD AUTO: 56.6 %
PLATELET # BLD AUTO: 306 K/UL
POTASSIUM SERPL-SCNC: 4.9 MMOL/L
PROT SERPL-MCNC: 7.8 G/DL
RBC # BLD: 4.58 M/UL
RBC # FLD: 16.7 %
SODIUM SERPL-SCNC: 140 MMOL/L
TTG IGA SER IA-ACNC: <1.2 U/ML
TTG IGA SER-ACNC: NEGATIVE
TTG IGG SER IA-ACNC: <1.2 U/ML
TTG IGG SER IA-ACNC: NEGATIVE
WBC # FLD AUTO: 9.5 K/UL

## 2022-11-18 NOTE — PHYSICAL EXAM
[No Acute Distress] : no acute distress [No Lymphadenopathy] : no lymphadenopathy [Supple] : supple [No Respiratory Distress] : no respiratory distress  [No Accessory Muscle Use] : no accessory muscle use [Clear to Auscultation] : lungs were clear to auscultation bilaterally [Normal Rate] : normal rate  [Regular Rhythm] : with a regular rhythm [Normal S1, S2] : normal S1 and S2 [No Murmur] : no murmur heard [No Edema] : there was no peripheral edema [Soft] : abdomen soft [Non Tender] : non-tender [Non-distended] : non-distended [Normal Supraclavicular Nodes] : no supraclavicular lymphadenopathy [Normal Posterior Cervical Nodes] : no posterior cervical lymphadenopathy [Normal Anterior Cervical Nodes] : no anterior cervical lymphadenopathy [No Spinal Tenderness] : no spinal tenderness [Speech Grossly Normal] : speech grossly normal [de-identified] : obese habitus [de-identified] : clear sclera [de-identified] : Patient declined and preferrd gyn

## 2022-11-18 NOTE — REVIEW OF SYSTEMS
[Back Pain] : back pain [Fever] : no fever [Chills] : no chills [Vision Problems] : no vision problems [Chest Pain] : no chest pain [Palpitations] : no palpitations [Lower Ext Edema] : no lower extremity edema [Dyspnea on Exertion] : no dyspnea on exertion [Abdominal Pain] : no abdominal pain [Nausea] : no nausea [Constipation] : no constipation [Diarrhea] : diarrhea [Vomiting] : no vomiting [Melena] : no melena [Dysuria] : no dysuria [Hematuria] : no hematuria [Headache] : no headache [Dizziness] : no dizziness [Fainting] : no fainting [Insomnia] : no insomnia [Anxiety] : no anxiety [Depression] : no depression [FreeTextEntry8] : external vaginal itch

## 2022-11-18 NOTE — ASSESSMENT
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 pylori negative on pathology report as per GI\par -Ordered H. pylori stool ag test\par -Ordered Celiac workup tissue transglutaminase IgA/IgG\par -HIV test\par -Will follow up results\par \par #External area of vaginal surface with pruritus a/w scratching and bleeding but without discharge \par -Patient declined exam of genital area and preferred to see Gyn\par -Gyn referral placed\par    \par #Low back pain of 2 weeks duration with some improvement. No red flags such as fevers or limited ROM\par -Recommended patient lose weight\par -Continue to monitor\par \par #HTN with /70 in office\par -Continue current BP medication regimen \par \par #HCM\par -CBC, CMP, A1c\par -Routine follow up\par \par Discussed case and management with Dr. Urbano Sharp

## 2022-11-18 NOTE — INTERPRETER SERVICES
[Patient Declined  Services] : - None: Patient declined  services [FreeTextEntry3] : Patient preferred speaking Bulgarian with native-Bulgarian speaking Resident Doctor [TWNoteComboBox1] : Marshallese

## 2022-11-18 NOTE — HISTORY OF PRESENT ILLNESS
[FreeTextEntry1] : Follow up [de-identified] : 47 F PMH PMH pre-DM, obesity, HTN, and obesity, presenting for follow up regarding recent colonoscopy which indicated inflammatory polyp in ascending colon and gastric mucosal lymphocytic gastritis but negative H. pylori pathology. Patient says she was told to follow up on certain blood work and other tests. Per most recent GI chart note, patient needs H. pylori stool ag test, celiac workup, and HIV test. However, patient has low back pain and 2 episodes of low back radiating to front of lower abd but not severe pain. Additionally, she has been experiencing an external vaginal itchy and scratches so much at night that she sometimes bleeds and it has been occurring 1 month. Also, experiencing 2 weeks of lower back pain 5/10 and sometimes 8/10 but no limited ROM. Otherwise, reports absence of fevers, chills, headaches, visual disturbances, shortness of breath, chest pain, palpitations, abdominal pain, nausea, diarrhea, hematochezia, melena, dysuria, hematuria, rashes, or lower extremity edema.\par

## 2022-11-23 LAB — H PYLORI AG STL QL: NEGATIVE

## 2022-12-05 ENCOUNTER — APPOINTMENT (OUTPATIENT)
Dept: INTERNAL MEDICINE | Facility: CLINIC | Age: 47
End: 2022-12-05

## 2022-12-26 ENCOUNTER — APPOINTMENT (OUTPATIENT)
Dept: ULTRASOUND IMAGING | Facility: CLINIC | Age: 47
End: 2022-12-26
Payer: COMMERCIAL

## 2022-12-26 ENCOUNTER — OUTPATIENT (OUTPATIENT)
Dept: OUTPATIENT SERVICES | Facility: HOSPITAL | Age: 47
LOS: 1 days | End: 2022-12-26
Payer: COMMERCIAL

## 2022-12-26 DIAGNOSIS — R74.8 ABNORMAL LEVELS OF OTHER SERUM ENZYMES: ICD-10-CM

## 2022-12-26 PROCEDURE — 76705 ECHO EXAM OF ABDOMEN: CPT

## 2022-12-26 PROCEDURE — 76705 ECHO EXAM OF ABDOMEN: CPT | Mod: 26

## 2022-12-30 ENCOUNTER — NON-APPOINTMENT (OUTPATIENT)
Age: 47
End: 2022-12-30

## 2023-01-23 ENCOUNTER — APPOINTMENT (OUTPATIENT)
Dept: INTERNAL MEDICINE | Facility: CLINIC | Age: 48
End: 2023-01-23
Payer: COMMERCIAL

## 2023-01-23 ENCOUNTER — OUTPATIENT (OUTPATIENT)
Dept: OUTPATIENT SERVICES | Facility: HOSPITAL | Age: 48
LOS: 1 days | End: 2023-01-23
Payer: SELF-PAY

## 2023-01-23 VITALS
WEIGHT: 199 LBS | DIASTOLIC BLOOD PRESSURE: 72 MMHG | HEART RATE: 70 BPM | BODY MASS INDEX: 37.57 KG/M2 | OXYGEN SATURATION: 98 % | HEIGHT: 61 IN | SYSTOLIC BLOOD PRESSURE: 128 MMHG

## 2023-01-23 DIAGNOSIS — K80.20 CALCULUS OF GALLBLADDER W/OUT CHOLECYSTITIS W/OUT OBSTRUCTION: ICD-10-CM

## 2023-01-23 DIAGNOSIS — I10 ESSENTIAL (PRIMARY) HYPERTENSION: ICD-10-CM

## 2023-01-23 DIAGNOSIS — K29.60 OTHER GASTRITIS WITHOUT BLEEDING: ICD-10-CM

## 2023-01-23 DIAGNOSIS — M54.50 LOW BACK PAIN, UNSPECIFIED: ICD-10-CM

## 2023-01-23 PROCEDURE — ZZZZZ: CPT

## 2023-01-23 PROCEDURE — G0463: CPT

## 2023-01-24 DIAGNOSIS — M54.50 LOW BACK PAIN, UNSPECIFIED: ICD-10-CM

## 2023-01-24 DIAGNOSIS — K80.20 CALCULUS OF GALLBLADDER WITHOUT CHOLECYSTITIS WITHOUT OBSTRUCTION: ICD-10-CM

## 2023-01-24 NOTE — PHYSICAL EXAM
[No Acute Distress] : no acute distress [Well Nourished] : well nourished [Well Developed] : well developed [Well-Appearing] : well-appearing [Normal Sclera/Conjunctiva] : normal sclera/conjunctiva [PERRL] : pupils equal round and reactive to light [EOMI] : extraocular movements intact [Normal Outer Ear/Nose] : the outer ears and nose were normal in appearance [Normal Oropharynx] : the oropharynx was normal [No JVD] : no jugular venous distention [No Lymphadenopathy] : no lymphadenopathy [Supple] : supple [Thyroid Normal, No Nodules] : the thyroid was normal and there were no nodules present [No Respiratory Distress] : no respiratory distress  [No Accessory Muscle Use] : no accessory muscle use [Clear to Auscultation] : lungs were clear to auscultation bilaterally [Normal Rate] : normal rate  [Regular Rhythm] : with a regular rhythm [Normal S1, S2] : normal S1 and S2 [No Murmur] : no murmur heard [No Carotid Bruits] : no carotid bruits [No Abdominal Bruit] : a ~M bruit was not heard ~T in the abdomen [No Varicosities] : no varicosities [Pedal Pulses Present] : the pedal pulses are present [No Edema] : there was no peripheral edema [No Palpable Aorta] : no palpable aorta [No Extremity Clubbing/Cyanosis] : no extremity clubbing/cyanosis [Soft] : abdomen soft [Non-distended] : non-distended [No Masses] : no abdominal mass palpated [No HSM] : no HSM [Normal Bowel Sounds] : normal bowel sounds [Normal Posterior Cervical Nodes] : no posterior cervical lymphadenopathy [Normal Anterior Cervical Nodes] : no anterior cervical lymphadenopathy [No CVA Tenderness] : no CVA  tenderness [No Spinal Tenderness] : no spinal tenderness [No Joint Swelling] : no joint swelling [Grossly Normal Strength/Tone] : grossly normal strength/tone [No Rash] : no rash [Coordination Grossly Intact] : coordination grossly intact [No Focal Deficits] : no focal deficits [Normal Gait] : normal gait [Normal Affect] : the affect was normal [Normal Insight/Judgement] : insight and judgment were intact [de-identified] : Mild RUQ TTP, negative Allan sign [de-identified] : +SLR, bilaterally

## 2023-01-24 NOTE — ASSESSMENT
[FreeTextEntry1] : 47 F PMH pre-DM, obesity, and HTN presenting for acute low back pain x 2 weeks, concerning for musculoskeletal low back pain vs lumbar radiculopathy. Also with stable cholelithiasis without cholecystitis\par \par #low back pain\par -provided Rx for physical therapy\par -recommended supportive treatment with Tylenol, warm and cool compresses, and staying active\par \par #cholelithiasis\par -discussed mild nature of patient's symptoms and managing with lifestyle modifications at this time\par -if patient should have worsening pain, advised to call us to provide referral to surgery\par \par #Elevated BP\par -c/w current BP regimen\par \par D/w Dr. Martinez\par RTC in 10 weeks if no better with her back pain; otherwise, 4-5 months for a1c recheck (pre-DM)

## 2023-01-24 NOTE — HISTORY OF PRESENT ILLNESS
[FreeTextEntry1] : recheck [de-identified] : 47 F PMH pre-DM, obesity, and HTN presenting for recheck. Patient states she was told to follow-up regarding her cholelithiasis on imaging, and also reports L>R moderate atraumatic low back pain for the past 2 months. States she has only mild RUQ abdominal pain when eating fatty meals and she has modified her diet and is able to manage her symptoms. 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 She localizes pain in her bilateral low back, which radiates down her lateral and posterior legs bilaterally. She also reports associated numbness along the lateral thigh bilaterally. States her pain is improved with rest, but the numbness persists and she has not tried any oral medications to relieve her symptoms. She denies any changes in bowel/bladder habits, weakness, or recent falls. Pt denies fevers/chills, HA, dizziness, cough, sore throat, rhinorrhea, CP, palpitations, SOB, n/v, or fatigue. No other acute complaints reported at this time.

## 2023-01-24 NOTE — INTERPRETER SERVICES
[[employer] ] : provided by [employer]   [Interpreters_IDNumber] : [id number] [Interpreters_FullName] : Rylee

## 2023-01-24 NOTE — REVIEW OF SYSTEMS
[Abdominal Pain] : abdominal pain [Back Pain] : back pain [Negative] : Heme/Lymph [Nausea] : no nausea [Vomiting] : no vomiting [FreeTextEntry7] : Mild

## 2023-03-30 NOTE — HISTORY OF PRESENT ILLNESS
[FreeTextEntry1] : CPE [de-identified] : Patient is a 44 y/o F with a PMH significant for HTN who presents today for a blood pressure check and CPE.\par \par HCM\par - HPV/PAP in 2014 --> due in 2019 for repeat\par - due for mammogram \par \par Overall feels well. Gets headaches occasionally but takes tylenol with relief. Has been taking her BP medications regularly since the last visit. She currently works in a kitchen as a cook. Does not exercise. She lives with her  and 4/6 of her children. Is sexually active but does not use contraception. Continues to have regular periods.  \par  28-Mar-2023 14:15

## 2023-06-12 ENCOUNTER — APPOINTMENT (OUTPATIENT)
Dept: INTERNAL MEDICINE | Facility: CLINIC | Age: 48
End: 2023-06-12
Payer: COMMERCIAL

## 2023-06-12 ENCOUNTER — OUTPATIENT (OUTPATIENT)
Dept: OUTPATIENT SERVICES | Facility: HOSPITAL | Age: 48
LOS: 1 days | End: 2023-06-12
Payer: SELF-PAY

## 2023-06-12 VITALS
WEIGHT: 201 LBS | SYSTOLIC BLOOD PRESSURE: 132 MMHG | BODY MASS INDEX: 37.95 KG/M2 | HEIGHT: 61 IN | HEART RATE: 75 BPM | OXYGEN SATURATION: 98 % | DIASTOLIC BLOOD PRESSURE: 82 MMHG

## 2023-06-12 DIAGNOSIS — M25.512 PAIN IN LEFT SHOULDER: ICD-10-CM

## 2023-06-12 DIAGNOSIS — M25.541 PAIN IN JOINTS OF RIGHT HAND: ICD-10-CM

## 2023-06-12 DIAGNOSIS — M79.651 PAIN IN RIGHT THIGH: ICD-10-CM

## 2023-06-12 DIAGNOSIS — I10 ESSENTIAL (PRIMARY) HYPERTENSION: ICD-10-CM

## 2023-06-12 DIAGNOSIS — M25.542 PAIN IN JOINTS OF RIGHT HAND: ICD-10-CM

## 2023-06-12 PROCEDURE — ZZZZZ: CPT | Mod: GE

## 2023-06-12 RX ORDER — CLOTRIMAZOLE AND BETAMETHASONE DIPROPIONATE 10; .5 MG/G; MG/G
1-0.05 CREAM TOPICAL TWICE DAILY
Qty: 1 | Refills: 0 | Status: DISCONTINUED | COMMUNITY
Start: 2022-03-08 | End: 2023-06-12

## 2023-06-12 RX ORDER — TRIAMCINOLONE ACETONIDE 1 MG/G
0.1 CREAM TOPICAL 3 TIMES DAILY
Qty: 1 | Refills: 0 | Status: DISCONTINUED | COMMUNITY
Start: 2021-08-09 | End: 2023-06-12

## 2023-06-12 RX ORDER — CHLORHEXIDINE GLUCONATE 4 %
325 (65 FE) LIQUID (ML) TOPICAL
Qty: 24 | Refills: 2 | Status: DISCONTINUED | COMMUNITY
Start: 2020-08-07 | End: 2023-06-12

## 2023-06-12 RX ORDER — POLYETHYLENE GLYCOL 3350 17 G/17G
17 POWDER, FOR SOLUTION ORAL
Qty: 238 | Refills: 0 | Status: DISCONTINUED | COMMUNITY
Start: 2022-08-30 | End: 2023-06-12

## 2023-06-12 RX ORDER — METFORMIN HYDROCHLORIDE 500 MG/1
500 TABLET, COATED ORAL DAILY
Qty: 50 | Refills: 0 | Status: DISCONTINUED | COMMUNITY
Start: 2021-08-09 | End: 2023-06-12

## 2023-06-12 NOTE — PHYSICAL EXAM
[No Acute Distress] : no acute distress [No Lymphadenopathy] : no lymphadenopathy [No Respiratory Distress] : no respiratory distress  [No Accessory Muscle Use] : no accessory muscle use [Clear to Auscultation] : lungs were clear to auscultation bilaterally [Normal Rate] : normal rate  [Regular Rhythm] : with a regular rhythm [Normal S1, S2] : normal S1 and S2 [No Murmur] : no murmur heard [No Edema] : there was no peripheral edema [Soft] : abdomen soft [Non Tender] : non-tender [Non-distended] : non-distended [No Masses] : no abdominal mass palpated [Normal Supraclavicular Nodes] : no supraclavicular lymphadenopathy [Normal Posterior Cervical Nodes] : no posterior cervical lymphadenopathy [Normal Anterior Cervical Nodes] : no anterior cervical lymphadenopathy [Speech Grossly Normal] : speech grossly normal [Memory Grossly Normal] : memory grossly normal [Normal Affect] : the affect was normal

## 2023-06-14 DIAGNOSIS — Z00.00 ENCOUNTER FOR GENERAL ADULT MEDICAL EXAMINATION WITHOUT ABNORMAL FINDINGS: ICD-10-CM

## 2023-06-14 DIAGNOSIS — M79.651 PAIN IN RIGHT THIGH: ICD-10-CM

## 2023-06-14 DIAGNOSIS — E66.01 MORBID (SEVERE) OBESITY DUE TO EXCESS CALORIES: ICD-10-CM

## 2023-06-14 DIAGNOSIS — M25.541 PAIN IN JOINTS OF RIGHT HAND: ICD-10-CM

## 2023-06-14 DIAGNOSIS — R73.03 PREDIABETES: ICD-10-CM

## 2023-06-14 DIAGNOSIS — M25.512 PAIN IN LEFT SHOULDER: ICD-10-CM

## 2023-06-14 NOTE — ASSESSMENT
[FreeTextEntry1] : 47 F PMH pre-DM, obesity, and HTN presenting for annual exam. She reports having intermittent left upper thigh pain/right hip pain, L shoulder pain, and metacarpal and proximal phalangeal join pain/numbness concerning for arthritic changes i/s/o obesity. 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# L shoulder pain\par -Suspected arthritic changes i.s.o obesity\par -Nutritionist consultation \par -Recommended weight loss\par -Offered weight loss clinic and medication for weight loss but patient prefers to diet and exercise first\par \par #HTN\par -Continue with lisinopril and metoprolol\par \par #Pre-DM\par -check A1c \par -nutritionist referral \par -educated patient on healthier eating habits\par \par #Obesity\par -Nutritionist referral\par -ordered lipid profile\par \par #HCM\par -Ordered CBC, CMP, lipid profile, A1c, serum Fe, Fe binding capacity\par -Referrals provided \par -Follow up in 3 months for weight loss and BP check\par \par Discussed case and management with Dr. Roseanne Amin\par

## 2023-06-14 NOTE — INTERPRETER SERVICES
[Patient Declined  Services] : - None: Patient declined  services [FreeTextEntry3] : Patient prefers speaking Nicaraguan with native-Nicaraguan speaking Resident Doctor\par  [TWNoteComboBox1] : Macedonian

## 2023-06-14 NOTE — END OF VISIT
[] : Resident [FreeTextEntry3] : weight management addressed at this visit. pt did not attempt metformin as previously prescribed. need close f/u and further discussion [Time Spent: ___ minutes] : I have spent [unfilled] minutes of time on the encounter.

## 2023-06-14 NOTE — REVIEW OF SYSTEMS
[Fever] : no fever [Vision Problems] : no vision problems [Chest Pain] : no chest pain [Palpitations] : no palpitations [Lower Ext Edema] : no lower extremity edema [Shortness Of Breath] : no shortness of breath [Dyspnea on Exertion] : no dyspnea on exertion [Abdominal Pain] : no abdominal pain [Nausea] : no nausea [Constipation] : no constipation [Diarrhea] : diarrhea [Vomiting] : no vomiting [Melena] : no melena [Dysuria] : no dysuria [Hematuria] : no hematuria [Itching] : no itching [Skin Rash] : no skin rash [FreeTextEntry9] : As per HPI

## 2023-06-14 NOTE — HISTORY OF PRESENT ILLNESS
[FreeTextEntry1] : Annual exam [de-identified] : 47 F PMH pre-DM, obesity, and HTN presenting annual exam. She reports having intermittent left upper thigh pain/right hip pain which she says starts with a baseline numbness (has had for years) and which is exacerbated by standing for long periods of time and progresses with a cold sensation, then warm, then 8/10 pulsating pain. During those painful episodes, she feels that her leg gets paralyzed from the pain and she has to sit down for 2-3 minutes and resolves. Additionally, over the past 2 months she has been experiencing numbness of her knuckles and proximal finger joint pains daily at night and during day painful but without numbness --> which improves throughout the day. 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 Otherwise, patient reports absence of fevers, headaches, visual disturbances, shortness of breath, shortness of breath on exertion, chest pain, palpitations, abdominal pain, nausea, vomiting, diarrhea, hematochezia, melena, dysuria, hematuria, rashes, or lower extremity edema. Had colonoscopy and endoscopy 10/12/22 which showed 2 polyps and 1 gastric mucosa nodule. Of note, she has been having irregular menses, e.g. about every 3 months or so. Patient reports taking only lisinopril and metoprolol meds but not metformin or iron supplements. She could not recall name of her meds but appears to have recognized when mentioned.

## 2023-06-16 LAB
IRON SATN MFR SERPL: 6 %
IRON SERPL-MCNC: 29 UG/DL
TIBC SERPL-MCNC: 492 UG/DL
UIBC SERPL-MCNC: 463 UG/DL

## 2023-07-10 ENCOUNTER — APPOINTMENT (OUTPATIENT)
Dept: OBGYN | Facility: CLINIC | Age: 48
End: 2023-07-10

## 2023-07-17 ENCOUNTER — APPOINTMENT (OUTPATIENT)
Dept: INTERNAL MEDICINE | Facility: CLINIC | Age: 48
End: 2023-07-17

## 2023-07-25 ENCOUNTER — LABORATORY RESULT (OUTPATIENT)
Age: 48
End: 2023-07-25

## 2023-07-25 ENCOUNTER — RESULT CHARGE (OUTPATIENT)
Age: 48
End: 2023-07-25

## 2023-07-25 DIAGNOSIS — R39.9 UNSPECIFIED SYMPTOMS AND SIGNS INVOLVING THE GENITOURINARY SYSTEM: ICD-10-CM

## 2023-07-25 DIAGNOSIS — R30.0 DYSURIA: ICD-10-CM

## 2023-07-25 PROCEDURE — 87086 URINE CULTURE/COLONY COUNT: CPT

## 2023-07-25 PROCEDURE — 36415 COLL VENOUS BLD VENIPUNCTURE: CPT

## 2023-07-25 PROCEDURE — G0463: CPT

## 2023-07-25 PROCEDURE — 83550 IRON BINDING TEST: CPT

## 2023-07-26 LAB
BILIRUB UR QL STRIP: NORMAL
CLARITY UR: CLEAR
GLUCOSE UR-MCNC: NORMAL
HCG UR QL: 0.2 EU/DL
HGB UR QL STRIP.AUTO: NORMAL
KETONES UR-MCNC: NORMAL
LEUKOCYTE ESTERASE UR QL STRIP: NORMAL
NITRITE UR QL STRIP: NORMAL
PH UR STRIP: 6
PROT UR STRIP-MCNC: NORMAL
SP GR UR STRIP: <=1.005

## 2023-07-27 LAB
CULTURE RESULTS: SIGNIFICANT CHANGE UP
SPECIMEN SOURCE: SIGNIFICANT CHANGE UP

## 2023-08-14 ENCOUNTER — LABORATORY RESULT (OUTPATIENT)
Age: 48
End: 2023-08-14

## 2023-08-14 ENCOUNTER — OUTPATIENT (OUTPATIENT)
Dept: OUTPATIENT SERVICES | Facility: HOSPITAL | Age: 48
LOS: 1 days | End: 2023-08-14
Payer: SELF-PAY

## 2023-08-14 ENCOUNTER — APPOINTMENT (OUTPATIENT)
Dept: OBGYN | Facility: CLINIC | Age: 48
End: 2023-08-14
Payer: COMMERCIAL

## 2023-08-14 VITALS
HEIGHT: 61 IN | BODY MASS INDEX: 39.08 KG/M2 | DIASTOLIC BLOOD PRESSURE: 82 MMHG | SYSTOLIC BLOOD PRESSURE: 134 MMHG | WEIGHT: 207 LBS

## 2023-08-14 DIAGNOSIS — N76.0 ACUTE VAGINITIS: ICD-10-CM

## 2023-08-14 DIAGNOSIS — B37.31 ACUTE CANDIDIASIS OF VULVA AND VAGINA: ICD-10-CM

## 2023-08-14 DIAGNOSIS — Z01.419 ENCOUNTER FOR GYNECOLOGICAL EXAMINATION (GENERAL) (ROUTINE) W/OUT ABNORMAL FINDINGS: ICD-10-CM

## 2023-08-14 DIAGNOSIS — Z11.3 ENCOUNTER FOR SCREENING FOR INFECTIONS WITH A PREDOMINANTLY SEXUAL MODE OF TRANSMISSION: ICD-10-CM

## 2023-08-14 DIAGNOSIS — B96.89 ACUTE VAGINITIS: ICD-10-CM

## 2023-08-14 DIAGNOSIS — R92.2 INCONCLUSIVE MAMMOGRAM: ICD-10-CM

## 2023-08-14 PROCEDURE — 88175 CYTOPATH C/V AUTO FLUID REDO: CPT

## 2023-08-14 PROCEDURE — 99396 PREV VISIT EST AGE 40-64: CPT

## 2023-08-14 PROCEDURE — 86780 TREPONEMA PALLIDUM: CPT

## 2023-08-14 PROCEDURE — 87389 HIV-1 AG W/HIV-1&-2 AB AG IA: CPT

## 2023-08-14 PROCEDURE — 87624 HPV HI-RISK TYP POOLED RSLT: CPT

## 2023-08-14 PROCEDURE — 87800 DETECT AGNT MULT DNA DIREC: CPT

## 2023-08-14 PROCEDURE — T1013: CPT

## 2023-08-14 PROCEDURE — 87340 HEPATITIS B SURFACE AG IA: CPT

## 2023-08-14 PROCEDURE — 87591 N.GONORRHOEAE DNA AMP PROB: CPT

## 2023-08-14 PROCEDURE — 36415 COLL VENOUS BLD VENIPUNCTURE: CPT

## 2023-08-14 PROCEDURE — 87491 CHLMYD TRACH DNA AMP PROBE: CPT

## 2023-08-14 PROCEDURE — G0463: CPT

## 2023-08-14 PROCEDURE — 86803 HEPATITIS C AB TEST: CPT

## 2023-08-14 NOTE — PHYSICAL EXAM
[Appropriately responsive] : appropriately responsive [Alert] : alert [No Acute Distress] : no acute distress [No Lymphadenopathy] : no lymphadenopathy [Soft] : soft [Non-tender] : non-tender [Non-distended] : non-distended [No HSM] : No HSM [No Lesions] : no lesions [No Mass] : no mass [Oriented x3] : oriented x3 [Examination Of The Breasts] : a normal appearance [No Masses] : no breast masses were palpable [Labia Majora] : normal [Labia Minora] : normal [No Bleeding] : There was no active vaginal bleeding [Normal] : normal [Normal Position] : in a normal position [Enlarged ___ wks] : not enlarged [Uterine Adnexae] : normal

## 2023-08-14 NOTE — HISTORY OF PRESENT ILLNESS
[FreeTextEntry1] : 49yo  (LMP 2023) pwith Pre-DM/ HTN/ h/o BTL, h/o HPV presents for annual gyn exam. Pt reports skipped menses this year.  Ganga hot flashes or  night sweats.   Sex active, monogamous, desires STD screen Complaining of vaginal itching x several weeks. Denies odor.burning.    - PAP 3/2022 neg  +HPV +18/45 - Colpo 2020- ECc neg - Mammo 2020 Birads 2 - colonoscopy 10/2022  Gen health followed by medicine
Xray Chest 1 View- PORTABLE-Urgent

## 2023-08-14 NOTE — REASON FOR VISIT
[Annual] : an annual visit. [[employer] ] : provided by [employer]   [Time Spent: ____ minutes] : Total time spent using  services: [unfilled] minutes. The patient's primary language is not English thus required  services. [Interpreters_IDNumber] : [id number] [TWNoteComboBox1] : Portuguese

## 2023-08-14 NOTE — PROCEDURE
[Cervical Pap Smear] : cervical Pap smear [Liquid Base] : liquid base [GC Chlamydia Culture] : GC Chlamydia Culture [Affirm (Triple Culture)] : Affirm (triple culture)

## 2023-08-14 NOTE — DISCUSSION/SUMMARY
[FreeTextEntry1] : 47yo P6- annual gyn exam  # vaginal itching - exam unremarkable except for vulvar erythema c/w scratching ?architectural change -- in setting of PRE Dm , will treat w/ Diflucan x 3  [ ]affirm sent - if no improvement , t/c vulvar bx to r/o lichen  # STD screen - discussed safe sex practices [ ]std panel sent  # h/o HR HPV (16/18) - colpo 5/2022 ECC neg - [ ]pap/ hpv collected today - f/u based on result  # HCM - [ ]pap/ mammo rx - colonoscopy up to date - gen health followed by medicine - discussed perimenopausal bleeding patterns-  pt to call for increased vag bleeding/ prolonged VB/ intermenstrual vb.  After 1 yr- PM, any bleeding after to be evaluated  RTC for ivory/prn or if itching not resolved Lori Oquendo, PAC

## 2023-08-15 LAB
C TRACH RRNA SPEC QL NAA+PROBE: SIGNIFICANT CHANGE UP
CANDIDA AB TITR SER: SIGNIFICANT CHANGE UP
G VAGINALIS DNA SPEC QL NAA+PROBE: DETECTED
HBV SURFACE AG SER-ACNC: SIGNIFICANT CHANGE UP
HCV AB S/CO SERPL IA: 0.24 S/CO — SIGNIFICANT CHANGE UP (ref 0–0.99)
HCV AB SERPL-IMP: SIGNIFICANT CHANGE UP
HIV 1+2 AB+HIV1 P24 AG SERPL QL IA: SIGNIFICANT CHANGE UP
HPV HIGH+LOW RISK DNA PNL CVX: SIGNIFICANT CHANGE UP
N GONORRHOEA RRNA SPEC QL NAA+PROBE: SIGNIFICANT CHANGE UP
SPECIMEN SOURCE: SIGNIFICANT CHANGE UP
T PALLIDUM AB TITR SER: NEGATIVE — SIGNIFICANT CHANGE UP
T VAGINALIS SPEC QL WET PREP: SIGNIFICANT CHANGE UP

## 2023-08-16 PROBLEM — N76.0 BACTERIAL VAGINITIS: Status: ACTIVE | Noted: 2023-08-16 | Resolved: 2023-09-15

## 2023-08-17 LAB — CYTOLOGY SPEC DOC CYTO: SIGNIFICANT CHANGE UP

## 2023-08-21 ENCOUNTER — OUTPATIENT (OUTPATIENT)
Dept: OUTPATIENT SERVICES | Facility: HOSPITAL | Age: 48
LOS: 1 days | End: 2023-08-21
Payer: SELF-PAY

## 2023-08-21 ENCOUNTER — APPOINTMENT (OUTPATIENT)
Dept: INTERNAL MEDICINE | Facility: CLINIC | Age: 48
End: 2023-08-21
Payer: COMMERCIAL

## 2023-08-21 VITALS
HEIGHT: 61 IN | SYSTOLIC BLOOD PRESSURE: 112 MMHG | BODY MASS INDEX: 37.57 KG/M2 | DIASTOLIC BLOOD PRESSURE: 82 MMHG | OXYGEN SATURATION: 98 % | HEART RATE: 75 BPM | WEIGHT: 199 LBS

## 2023-08-21 DIAGNOSIS — R00.2 PALPITATIONS: ICD-10-CM

## 2023-08-21 DIAGNOSIS — I10 ESSENTIAL (PRIMARY) HYPERTENSION: ICD-10-CM

## 2023-08-21 PROCEDURE — G0463: CPT

## 2023-08-21 PROCEDURE — 84443 ASSAY THYROID STIM HORMONE: CPT

## 2023-08-21 PROCEDURE — 36415 COLL VENOUS BLD VENIPUNCTURE: CPT

## 2023-08-21 PROCEDURE — 99213 OFFICE O/P EST LOW 20 MIN: CPT | Mod: GE

## 2023-08-21 NOTE — PHYSICAL EXAM
[No Acute Distress] : no acute distress [No Lymphadenopathy] : no lymphadenopathy [No Respiratory Distress] : no respiratory distress  [No Accessory Muscle Use] : no accessory muscle use [Clear to Auscultation] : lungs were clear to auscultation bilaterally [Normal Rate] : normal rate  [Regular Rhythm] : with a regular rhythm [No Murmur] : no murmur heard [Normal S1, S2] : normal S1 and S2 [Soft] : abdomen soft [Non Tender] : non-tender [Non-distended] : non-distended [Normal Supraclavicular Nodes] : no supraclavicular lymphadenopathy [Normal Posterior Cervical Nodes] : no posterior cervical lymphadenopathy [Normal Anterior Cervical Nodes] : no anterior cervical lymphadenopathy [Speech Grossly Normal] : speech grossly normal [Normal Affect] : the affect was normal [Alert and Oriented x3] : oriented to person, place, and time

## 2023-08-22 LAB — TSH SERPL-ACNC: 2.64 UIU/ML

## 2023-08-22 NOTE — INTERPRETER SERVICES
[Patient Declined  Services] : - None: Patient declined  services [FreeTextEntry3] : Patient prefers speaking Dominican with native-Dominican speaking Resident Doctor [TWNoteComboBox1] : Djib[place]ian

## 2023-08-22 NOTE — ASSESSMENT
[FreeTextEntry1] : 48 F PMH pre-DM, obesity, and HTN presenting follow up on BP check and weight loss.   #HTN -/82 is better control on lisinopril 20mg  -Continue lisinopril 20mg qd  #Obesity# weight loss -Patient lost `8 pounds from 207 to 199 lbs -Nutritionist consult to be placed  #Pre-DM -Continue to monitor  #Fe iron deficiency on labs -Will follow up repeat CBC during next visit -May need to start on iron supplement   Discussed case and management with Dr. Pravin Jackson

## 2023-08-22 NOTE — HISTORY OF PRESENT ILLNESS
[FreeTextEntry1] : Follow  up  [de-identified] : 48 F PMH pre-DM, obesity, and HTN presenting follow up on BP check and weight loss. Patient says that she experienced episodes of RUQ abd pain that happens especially when eating greasy foods or when sometimes palpating her RUQ abd. She remarks that she feels her RUQ abd feels like a "ball". Additionally, she has experienced palpitations in the past but without other symptoms. Otherwise, she reports absence of fevers, shortness of breath, shortness of breath on exertion, chest pain, abdominal pain, nausea, vomiting, diarrhea, hematochezia, melena, dysuria, hematuria, or lower extremity edema. However, she did experience a 1x episode of dizziness described as room spinning when standing without a clear inciting factor. OIf note, patient says it has been very difficult for her to lose weight.

## 2023-08-22 NOTE — REVIEW OF SYSTEMS
[Palpitations] : palpitations [Dizziness] : dizziness [Fever] : no fever [Chest Pain] : no chest pain [Lower Ext Edema] : no lower extremity edema [Shortness Of Breath] : no shortness of breath [Dyspnea on Exertion] : no dyspnea on exertion [Abdominal Pain] : no abdominal pain [Nausea] : no nausea [Diarrhea] : diarrhea [Vomiting] : no vomiting [Dysuria] : no dysuria [Hematuria] : no hematuria [Fainting] : no fainting [FreeTextEntry5] : As per HPI [de-identified] : As per HPI

## 2023-08-24 DIAGNOSIS — Z12.39 ENCOUNTER FOR OTHER SCREENING FOR MALIGNANT NEOPLASM OF BREAST: ICD-10-CM

## 2023-08-24 DIAGNOSIS — R92.2 INCONCLUSIVE MAMMOGRAM: ICD-10-CM

## 2023-08-24 DIAGNOSIS — Z11.3 ENCOUNTER FOR SCREENING FOR INFECTIONS WITH A PREDOMINANTLY SEXUAL MODE OF TRANSMISSION: ICD-10-CM

## 2023-08-24 DIAGNOSIS — B37.31 ACUTE CANDIDIASIS OF VULVA AND VAGINA: ICD-10-CM

## 2023-08-24 DIAGNOSIS — Z01.419 ENCOUNTER FOR GYNECOLOGICAL EXAMINATION (GENERAL) (ROUTINE) WITHOUT ABNORMAL FINDINGS: ICD-10-CM

## 2023-08-28 DIAGNOSIS — R00.2 PALPITATIONS: ICD-10-CM

## 2023-09-12 ENCOUNTER — APPOINTMENT (OUTPATIENT)
Dept: CARDIOLOGY | Facility: HOSPITAL | Age: 48
End: 2023-09-12

## 2023-10-16 ENCOUNTER — OUTPATIENT (OUTPATIENT)
Dept: OUTPATIENT SERVICES | Facility: HOSPITAL | Age: 48
LOS: 1 days | End: 2023-10-16

## 2023-10-16 ENCOUNTER — APPOINTMENT (OUTPATIENT)
Dept: INTERNAL MEDICINE | Facility: CLINIC | Age: 48
End: 2023-10-16
Payer: COMMERCIAL

## 2023-10-16 DIAGNOSIS — I10 ESSENTIAL (PRIMARY) HYPERTENSION: ICD-10-CM

## 2023-10-16 PROCEDURE — 97802 MEDICAL NUTRITION INDIV IN: CPT

## 2023-10-30 ENCOUNTER — APPOINTMENT (OUTPATIENT)
Dept: INTERNAL MEDICINE | Facility: CLINIC | Age: 48
End: 2023-10-30

## 2023-11-14 ENCOUNTER — NON-APPOINTMENT (OUTPATIENT)
Age: 48
End: 2023-11-14

## 2023-12-18 ENCOUNTER — APPOINTMENT (OUTPATIENT)
Dept: INTERNAL MEDICINE | Facility: CLINIC | Age: 48
End: 2023-12-18

## 2023-12-26 ENCOUNTER — OUTPATIENT (OUTPATIENT)
Dept: OUTPATIENT SERVICES | Facility: HOSPITAL | Age: 48
LOS: 1 days | End: 2023-12-26
Payer: SELF-PAY

## 2023-12-26 ENCOUNTER — APPOINTMENT (OUTPATIENT)
Dept: INTERNAL MEDICINE | Facility: CLINIC | Age: 48
End: 2023-12-26
Payer: COMMERCIAL

## 2023-12-26 VITALS
DIASTOLIC BLOOD PRESSURE: 90 MMHG | WEIGHT: 204 LBS | OXYGEN SATURATION: 94 % | HEIGHT: 61 IN | HEART RATE: 70 BPM | BODY MASS INDEX: 38.51 KG/M2 | SYSTOLIC BLOOD PRESSURE: 130 MMHG

## 2023-12-26 DIAGNOSIS — I10 ESSENTIAL (PRIMARY) HYPERTENSION: ICD-10-CM

## 2023-12-26 DIAGNOSIS — E78.5 HYPERLIPIDEMIA, UNSPECIFIED: ICD-10-CM

## 2023-12-26 DIAGNOSIS — E66.01 MORBID (SEVERE) OBESITY DUE TO EXCESS CALORIES: ICD-10-CM

## 2023-12-26 DIAGNOSIS — E55.9 VITAMIN D DEFICIENCY, UNSPECIFIED: ICD-10-CM

## 2023-12-26 DIAGNOSIS — R73.03 PREDIABETES.: ICD-10-CM

## 2023-12-26 DIAGNOSIS — M25.512 PAIN IN RIGHT SHOULDER: ICD-10-CM

## 2023-12-26 DIAGNOSIS — M25.511 PAIN IN RIGHT SHOULDER: ICD-10-CM

## 2023-12-26 PROCEDURE — 99213 OFFICE O/P EST LOW 20 MIN: CPT | Mod: GC

## 2023-12-26 PROCEDURE — G0463: CPT

## 2023-12-27 NOTE — HISTORY OF PRESENT ILLNESS
[FreeTextEntry8] : 48 F PMH pre-DM, obesity, and HTN presents to clinic complaining of bilateral shoulder pain for the past few months, that has worsened during the past few weeks. Her pain is worse in the right arm when compared to the left. The shoulder pain radiates down her arms to her hands. The patient denies tingling or paresthesias. The patient has taken tylenol prn which has helped her pain. The pain is worsened with overhead activities. During this time period, she also experiences bilateral nonradiating knee pain and neck pain.  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 The remaining ROS is unremarkable.

## 2023-12-27 NOTE — PHYSICAL EXAM
[No Acute Distress] : no acute distress [Normal Sclera/Conjunctiva] : normal sclera/conjunctiva [No Respiratory Distress] : no respiratory distress  [Normal Rate] : normal rate  [Regular Rhythm] : with a regular rhythm [No Edema] : there was no peripheral edema [Soft] : abdomen soft [Non Tender] : non-tender [de-identified] : +TTP over arms bilaterally and decreased ROM on shoulder abduction. + Empty can test; 5/5 strength on flexion and extension  in UE

## 2023-12-27 NOTE — ASSESSMENT
[FreeTextEntry1] : 48 F PMH pre-DM, obesity, and HTN presenting for bilateral shoulder pain.  #HCM discuss at next visit  RTC 3 months -Obed Corral, PGY1  CDW Dr. Mejia

## 2023-12-27 NOTE — REVIEW OF SYSTEMS
[Joint Pain] : joint pain [Fever] : no fever [Night Sweats] : no night sweats [Discharge] : no discharge [Vision Problems] : no vision problems [Nasal Discharge] : no nasal discharge [Chest Pain] : no chest pain [Palpitations] : no palpitations [Shortness Of Breath] : no shortness of breath [Nausea] : no nausea [Vomiting] : no vomiting [FreeTextEntry9] : + shoulder pain and knee pain b/l

## 2024-01-04 DIAGNOSIS — E66.01 MORBID (SEVERE) OBESITY DUE TO EXCESS CALORIES: ICD-10-CM

## 2024-01-04 DIAGNOSIS — E78.5 HYPERLIPIDEMIA, UNSPECIFIED: ICD-10-CM

## 2024-01-04 DIAGNOSIS — M25.512 PAIN IN LEFT SHOULDER: ICD-10-CM

## 2024-01-04 DIAGNOSIS — M25.511 PAIN IN RIGHT SHOULDER: ICD-10-CM

## 2024-01-04 DIAGNOSIS — E55.9 VITAMIN D DEFICIENCY, UNSPECIFIED: ICD-10-CM

## 2024-01-04 DIAGNOSIS — R73.03 PREDIABETES: ICD-10-CM

## 2024-02-16 RX ORDER — METOPROLOL SUCCINATE 25 MG/1
25 TABLET, EXTENDED RELEASE ORAL
Qty: 90 | Refills: 3 | Status: ACTIVE | COMMUNITY
Start: 2020-02-10 | End: 1900-01-01

## 2024-03-05 ENCOUNTER — OUTPATIENT (OUTPATIENT)
Dept: OUTPATIENT SERVICES | Facility: HOSPITAL | Age: 49
LOS: 1 days | End: 2024-03-05
Payer: SELF-PAY

## 2024-03-05 ENCOUNTER — APPOINTMENT (OUTPATIENT)
Dept: INTERNAL MEDICINE | Facility: CLINIC | Age: 49
End: 2024-03-05
Payer: COMMERCIAL

## 2024-03-05 VITALS
SYSTOLIC BLOOD PRESSURE: 120 MMHG | HEIGHT: 61 IN | HEART RATE: 75 BPM | DIASTOLIC BLOOD PRESSURE: 80 MMHG | WEIGHT: 209 LBS | BODY MASS INDEX: 39.46 KG/M2 | OXYGEN SATURATION: 96 %

## 2024-03-05 DIAGNOSIS — M25.519 PAIN IN UNSPECIFIED SHOULDER: ICD-10-CM

## 2024-03-05 DIAGNOSIS — I10 ESSENTIAL (PRIMARY) HYPERTENSION: ICD-10-CM

## 2024-03-05 DIAGNOSIS — Z23 ENCOUNTER FOR IMMUNIZATION: ICD-10-CM

## 2024-03-05 DIAGNOSIS — Z12.39 ENCOUNTER FOR OTHER SCREENING FOR MALIGNANT NEOPLASM OF BREAST: ICD-10-CM

## 2024-03-05 PROCEDURE — G0008: CPT

## 2024-03-05 PROCEDURE — 90686 IIV4 VACC NO PRSV 0.5 ML IM: CPT

## 2024-03-05 PROCEDURE — G0463: CPT

## 2024-03-05 PROCEDURE — 99213 OFFICE O/P EST LOW 20 MIN: CPT | Mod: GC

## 2024-03-05 RX ORDER — NITROFURANTOIN (MONOHYDRATE/MACROCRYSTALS) 25; 75 MG/1; MG/1
100 CAPSULE ORAL
Qty: 10 | Refills: 0 | Status: DISCONTINUED | COMMUNITY
Start: 2023-07-25 | End: 2024-03-05

## 2024-03-05 RX ORDER — METRONIDAZOLE 7.5 MG/G
0.75 GEL VAGINAL
Qty: 1 | Refills: 0 | Status: DISCONTINUED | COMMUNITY
Start: 2023-08-16 | End: 2024-03-05

## 2024-03-05 RX ORDER — FLUCONAZOLE 150 MG/1
150 TABLET ORAL
Qty: 3 | Refills: 1 | Status: DISCONTINUED | COMMUNITY
Start: 2023-08-14 | End: 2024-03-05

## 2024-03-06 NOTE — HISTORY OF PRESENT ILLNESS
[FreeTextEntry1] : RPA [de-identified] : Pt is a 48 F PMH pre-DM, obesity, and HTN presents to clinic complaining of bilateral shoulder pain and back pain. Patient was last seen in clinic in Dec 2023 for same symptoms. Pain is worse in R as compared to left arm. The shoulder pain radiates down her arms to her hands and is always persistent. No numbness/ tingling. The pain is worsened with overhead activities. The patient has taken tylenol prn which has helped her pain, is taking 2 pills of tylenol, once a week. Back pain is located in b/l lower thoracic region, no warning signs/symptoms such as fevers, numbness, fecal or urinary incontinence. Pt also reports that she has experienced chills. She reports experiencing diarrhea several times this month. No recent dietary changes. No recent travel history. No sick contacts.   social:  Pt lives with children/extended family. 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

## 2024-03-06 NOTE — REVIEW OF SYSTEMS
[Chills] : chills [Abdominal Pain] : abdominal pain [Diarrhea] : diarrhea [Muscle Pain] : muscle pain [Muscle Weakness] : muscle weakness [Back Pain] : back pain [Headache] : headache [Negative] : Heme/Lymph [Fever] : no fever [Hot Flashes] : no hot flashes [Fatigue] : no fatigue [Recent Change In Weight] : ~T no recent weight change [Night Sweats] : no night sweats [Discharge] : no discharge [Pain] : no pain [Redness] : no redness [Dryness] : no dryness  [Itching] : no itching [Nausea] : no nausea [Constipation] : no constipation [Heartburn] : no heartburn [Vomiting] : no vomiting [Joint Pain] : no joint pain [Melena] : no melena [Joint Stiffness] : no joint stiffness [Joint Swelling] : no joint swelling [Dizziness] : no dizziness [Fainting] : no fainting [Confusion] : no confusion [Memory Loss] : no memory loss [Unsteady Walking] : no ataxia [FreeTextEntry3] : intermittent blurry vision

## 2024-03-06 NOTE — PHYSICAL EXAM
[No Acute Distress] : no acute distress [Well Nourished] : well nourished [Well Developed] : well developed [Well-Appearing] : well-appearing [Normal Sclera/Conjunctiva] : normal sclera/conjunctiva [PERRL] : pupils equal round and reactive to light [EOMI] : extraocular movements intact [Normal Outer Ear/Nose] : the outer ears and nose were normal in appearance [Normal Oropharynx] : the oropharynx was normal [Supple] : supple [No Respiratory Distress] : no respiratory distress  [No Accessory Muscle Use] : no accessory muscle use [Normal Rate] : normal rate  [Clear to Auscultation] : lungs were clear to auscultation bilaterally [Regular Rhythm] : with a regular rhythm [No Edema] : there was no peripheral edema [Non Tender] : non-tender [Soft] : abdomen soft [No HSM] : no HSM [Non-distended] : non-distended [Normal Bowel Sounds] : normal bowel sounds [No Spinal Tenderness] : no spinal tenderness [No Joint Swelling] : no joint swelling [No Rash] : no rash [No Focal Deficits] : no focal deficits [Coordination Grossly Intact] : coordination grossly intact [Normal Gait] : normal gait [Normal Affect] : the affect was normal [Normal Insight/Judgement] : insight and judgment were intact [de-identified] : intact passive and active ROM, however pain eliicted on R shoulder with active ROM, +R empty can test

## 2024-03-11 DIAGNOSIS — Z12.39 ENCOUNTER FOR OTHER SCREENING FOR MALIGNANT NEOPLASM OF BREAST: ICD-10-CM

## 2024-03-11 DIAGNOSIS — M25.519 PAIN IN UNSPECIFIED SHOULDER: ICD-10-CM

## 2024-03-11 DIAGNOSIS — Z23 ENCOUNTER FOR IMMUNIZATION: ICD-10-CM

## 2024-03-18 ENCOUNTER — APPOINTMENT (OUTPATIENT)
Dept: MAMMOGRAPHY | Facility: IMAGING CENTER | Age: 49
End: 2024-03-18
Payer: SELF-PAY

## 2024-03-18 ENCOUNTER — RESULT REVIEW (OUTPATIENT)
Age: 49
End: 2024-03-18

## 2024-03-18 ENCOUNTER — OUTPATIENT (OUTPATIENT)
Dept: OUTPATIENT SERVICES | Facility: HOSPITAL | Age: 49
LOS: 1 days | End: 2024-03-18
Payer: SELF-PAY

## 2024-03-18 DIAGNOSIS — Z12.39 ENCOUNTER FOR OTHER SCREENING FOR MALIGNANT NEOPLASM OF BREAST: ICD-10-CM

## 2024-03-18 PROCEDURE — 77063 BREAST TOMOSYNTHESIS BI: CPT

## 2024-03-18 PROCEDURE — 77067 SCR MAMMO BI INCL CAD: CPT

## 2024-03-18 PROCEDURE — 77067 SCR MAMMO BI INCL CAD: CPT | Mod: 26

## 2024-03-18 PROCEDURE — 77063 BREAST TOMOSYNTHESIS BI: CPT | Mod: 26

## 2024-05-13 ENCOUNTER — RX RENEWAL (OUTPATIENT)
Age: 49
End: 2024-05-13

## 2024-06-10 ENCOUNTER — OUTPATIENT (OUTPATIENT)
Dept: OUTPATIENT SERVICES | Facility: HOSPITAL | Age: 49
LOS: 1 days | End: 2024-06-10
Payer: SELF-PAY

## 2024-06-10 ENCOUNTER — LABORATORY RESULT (OUTPATIENT)
Age: 49
End: 2024-06-10

## 2024-06-10 ENCOUNTER — APPOINTMENT (OUTPATIENT)
Dept: OBGYN | Facility: CLINIC | Age: 49
End: 2024-06-10
Payer: COMMERCIAL

## 2024-06-10 VITALS — DIASTOLIC BLOOD PRESSURE: 80 MMHG | SYSTOLIC BLOOD PRESSURE: 122 MMHG | BODY MASS INDEX: 40.25 KG/M2 | WEIGHT: 213 LBS

## 2024-06-10 DIAGNOSIS — N90.89 OTHER SPECIFIED NONINFLAMMATORY DISORDERS OF VULVA AND PERINEUM: ICD-10-CM

## 2024-06-10 DIAGNOSIS — N76.0 ACUTE VAGINITIS: ICD-10-CM

## 2024-06-10 PROCEDURE — T1013: CPT

## 2024-06-10 PROCEDURE — G0463: CPT

## 2024-06-10 PROCEDURE — 99213 OFFICE O/P EST LOW 20 MIN: CPT | Mod: GC

## 2024-06-13 NOTE — PHYSICAL EXAM
[FreeTextEntry1] : pale patches along bilateral labia majora, 2 shallow ulcerations on R labia 1cm at 10:00 and 0.5cm at 8:00 [FreeTextEntry2] : labia minora architecture intact

## 2024-06-13 NOTE — HISTORY OF PRESENT ILLNESS
[FreeTextEntry1] : 47yo  LMP 3 months ago presenting with 2 months of vulvar irritation. She reports the irritation is more external on the skin than internal. She reports scratching at night with occasional bleeding from her scratching. She denies abnormal discharge. She has had vulvar irritation before a year ago and was diagnosed with BV, improved with medication. Irregular menses occurring every 3 months. Reports hot flashes, vaginal dryness   OB:  x6  GYN: History of abnormal pap (see below). Hx BTL  23 Pap: NILM HPV neg  22 Colpo: Minute fragments of benign endocervical epithelium 3/2022 Pap: NILM HPV 18+  2019: HPV 18/45 and ASCUS s/p colpo/ECC ( - benign)   PMSH: prediabetes, HTN

## 2024-06-13 NOTE — REASON FOR VISIT
[[employer] ] : provided by [employer]   [Time Spent: ____ minutes] : Total time spent using  services: [unfilled] minutes. The patient's primary language is not English thus required  services. [Interpreters_IDNumber] : [id number] [TWNoteComboBox1] : [place] Islander

## 2024-06-13 NOTE — PLAN
[FreeTextEntry1] : 47yo  LMP 3 months ago presenting with 2 months of vulvar irritation. Physical exam significant for pale patches along bilateral labia majora, 2 shallow ulcerations on R labia. Differential includes lichen simplex chronicus and malignancy given history of high-risk HPV. - Vulvar biopsies performed at 1:00 and 8:00 of labia majora - Patient counseled postoperative care for vulvar biopsy: ibuprofen for pain, sitz baths, pat to dry, no soap on vulva - Affirm collected - Patient counseled on vulvar hygiene and avoiding irritants. Worksheet with instructions provided in Eritrean  Patient seen and examined with Dr. Mariajose Villanueva, PGY2

## 2024-06-14 DIAGNOSIS — N90.89 OTHER SPECIFIED NONINFLAMMATORY DISORDERS OF VULVA AND PERINEUM: ICD-10-CM

## 2024-06-18 ENCOUNTER — NON-APPOINTMENT (OUTPATIENT)
Age: 49
End: 2024-06-18

## 2024-06-18 ENCOUNTER — APPOINTMENT (OUTPATIENT)
Dept: INTERNAL MEDICINE | Facility: CLINIC | Age: 49
End: 2024-06-18
Payer: COMMERCIAL

## 2024-06-18 ENCOUNTER — OUTPATIENT (OUTPATIENT)
Dept: OUTPATIENT SERVICES | Facility: HOSPITAL | Age: 49
LOS: 1 days | End: 2024-06-18
Payer: SELF-PAY

## 2024-06-18 VITALS
BODY MASS INDEX: 40.97 KG/M2 | SYSTOLIC BLOOD PRESSURE: 120 MMHG | OXYGEN SATURATION: 98 % | WEIGHT: 217 LBS | DIASTOLIC BLOOD PRESSURE: 86 MMHG | HEART RATE: 85 BPM | HEIGHT: 61 IN

## 2024-06-18 DIAGNOSIS — M25.562 PAIN IN LEFT KNEE: ICD-10-CM

## 2024-06-18 DIAGNOSIS — I10 ESSENTIAL (PRIMARY) HYPERTENSION: ICD-10-CM

## 2024-06-18 DIAGNOSIS — Z13.6 ENCOUNTER FOR SCREENING FOR CARDIOVASCULAR DISORDERS: ICD-10-CM

## 2024-06-18 DIAGNOSIS — Z00.00 ENCOUNTER FOR GENERAL ADULT MEDICAL EXAMINATION W/OUT ABNORMAL FINDINGS: ICD-10-CM

## 2024-06-18 DIAGNOSIS — Z00.00 ENCOUNTER FOR GENERAL ADULT MEDICAL EXAMINATION WITHOUT ABNORMAL FINDINGS: ICD-10-CM

## 2024-06-18 PROCEDURE — 84443 ASSAY THYROID STIM HORMONE: CPT

## 2024-06-18 PROCEDURE — G0463: CPT

## 2024-06-18 PROCEDURE — 83036 HEMOGLOBIN GLYCOSYLATED A1C: CPT

## 2024-06-18 PROCEDURE — 80053 COMPREHEN METABOLIC PANEL: CPT

## 2024-06-18 PROCEDURE — 85027 COMPLETE CBC AUTOMATED: CPT

## 2024-06-18 PROCEDURE — 99396 PREV VISIT EST AGE 40-64: CPT | Mod: GC

## 2024-06-18 PROCEDURE — 80061 LIPID PANEL: CPT

## 2024-06-19 LAB
ALBUMIN SERPL ELPH-MCNC: 4.6 G/DL
ALP BLD-CCNC: 139 U/L
ALT SERPL-CCNC: 41 U/L
ANION GAP SERPL CALC-SCNC: 12 MMOL/L
APPEARANCE: CLEAR
AST SERPL-CCNC: 28 U/L
BILIRUB SERPL-MCNC: <0.2 MG/DL
BILIRUBIN URINE: NEGATIVE
BLOOD URINE: NEGATIVE
BUN SERPL-MCNC: 19 MG/DL
CALCIUM SERPL-MCNC: 9.1 MG/DL
CHLORIDE SERPL-SCNC: 105 MMOL/L
CHOLEST SERPL-MCNC: 199 MG/DL
CO2 SERPL-SCNC: 22 MMOL/L
COLOR: YELLOW
CREAT SERPL-MCNC: 0.94 MG/DL
EGFR: 75 ML/MIN/1.73M2
ESTIMATED AVERAGE GLUCOSE: 131 MG/DL
GLUCOSE QUALITATIVE U: NEGATIVE MG/DL
GLUCOSE SERPL-MCNC: 96 MG/DL
HBA1C MFR BLD HPLC: 6.2 %
HCT VFR BLD CALC: 36.1 %
HDLC SERPL-MCNC: 44 MG/DL
HGB BLD-MCNC: 11.2 G/DL
KETONES URINE: NEGATIVE MG/DL
LDLC SERPL CALC-MCNC: 113 MG/DL
LEUKOCYTE ESTERASE URINE: NEGATIVE
MCHC RBC-ENTMCNC: 25.5 PG
MCHC RBC-ENTMCNC: 31 GM/DL
MCV RBC AUTO: 82.2 FL
NITRITE URINE: NEGATIVE
NONHDLC SERPL-MCNC: 155 MG/DL
PH URINE: 6
PLATELET # BLD AUTO: 289 K/UL
POTASSIUM SERPL-SCNC: 3.9 MMOL/L
PROT SERPL-MCNC: 7.6 G/DL
PROTEIN URINE: NEGATIVE MG/DL
RBC # BLD: 4.39 M/UL
RBC # FLD: 15.7 %
SODIUM SERPL-SCNC: 139 MMOL/L
SPECIFIC GRAVITY URINE: 1.02
TRIGL SERPL-MCNC: 239 MG/DL
TSH SERPL-ACNC: 2.74 UIU/ML
UROBILINOGEN URINE: 0.2 MG/DL
WBC # FLD AUTO: 8.82 K/UL

## 2024-06-26 DIAGNOSIS — N90.4 LEUKOPLAKIA OF VULVA: ICD-10-CM

## 2024-06-26 DIAGNOSIS — L28.0 LICHEN SIMPLEX CHRONICUS: ICD-10-CM

## 2024-06-26 RX ORDER — CLOBETASOL PROPIONATE 0.5 MG/G
0.05 CREAM TOPICAL
Qty: 1 | Refills: 4 | Status: ACTIVE | COMMUNITY
Start: 2024-06-26 | End: 1900-01-01

## 2024-07-01 NOTE — HISTORY OF PRESENT ILLNESS
[FreeTextEntry1] : CPE [de-identified] : 48F PMH pre-DM, obesity, and HTN preesnts for CPE.  #pre-DM - A1C 6.1 last year  #HTN - Meds: lisinopril 20 + metoprolol 25mg qd - No issues with medications, /86 today, usually 120s/80s at home - EKG negative  #Obesity - BMI 40.25 - Pt has tried several different medications for weight loss, requesting weight management referral  #L Knee pain - Across L lateral aspect, more along quad tendon insertion point and lateral joint space - Pt notes started x1 week after twisting motion, feels like she felt a clicking after - Denies any skin changes, fever, swelling, inability to bear weight, numbness/tingling, back pain

## 2024-07-01 NOTE — INTERPRETER SERVICES
[Ad Hoc ] : provided by an ad hoc  [Interpreters_Relationshiptopatient] : Daughter [TWNoteComboBox1] : [place]

## 2024-07-01 NOTE — HEALTH RISK ASSESSMENT
[Very Good] : ~his/her~  mood as very good [No] : In the past 12 months have you used drugs other than those required for medical reasons? No [No falls in past year] : Patient reported no falls in the past year [0] : 2) Feeling down, depressed, or hopeless: Not at all (0) [PHQ-2 Negative - No further assessment needed] : PHQ-2 Negative - No further assessment needed [Never] : Never [None] : None [With Family] : lives with family [Employed] : employed [Feels Safe at Home] : Feels safe at home [Fully functional (bathing, dressing, toileting, transferring, walking, feeding)] : Fully functional (bathing, dressing, toileting, transferring, walking, feeding) [Fully functional (using the telephone, shopping, preparing meals, housekeeping, doing laundry, using] : Fully functional and needs no help or supervision to perform IADLs (using the telephone, shopping, preparing meals, housekeeping, doing laundry, using transportation, managing medications and managing finances) [Smoke Detector] : smoke detector [Seat Belt] :  uses seat belt [de-identified] : walks [de-identified] : home cooking, avoids fast foods, simple sugars [[id number]] : 0 [Sexually Active] : not sexually active [High Risk Behavior] : no high risk behavior [Reports changes in hearing] : Reports no changes in hearing [Reports changes in vision] : Reports no changes in vision [Reports changes in dental health] : Reports no changes in dental health

## 2024-07-01 NOTE — PHYSICAL EXAM
[No Acute Distress] : no acute distress [Well Nourished] : well nourished [Well Developed] : well developed [Well-Appearing] : well-appearing [Normal Sclera/Conjunctiva] : normal sclera/conjunctiva [PERRL] : pupils equal round and reactive to light [EOMI] : extraocular movements intact [Normal Outer Ear/Nose] : the outer ears and nose were normal in appearance [Normal Oropharynx] : the oropharynx was normal [No JVD] : no jugular venous distention [No Lymphadenopathy] : no lymphadenopathy [Supple] : supple [Thyroid Normal, No Nodules] : the thyroid was normal and there were no nodules present [No Respiratory Distress] : no respiratory distress  [No Accessory Muscle Use] : no accessory muscle use [Clear to Auscultation] : lungs were clear to auscultation bilaterally [Normal Rate] : normal rate  [Regular Rhythm] : with a regular rhythm [Normal S1, S2] : normal S1 and S2 [No Murmur] : no murmur heard [No Carotid Bruits] : no carotid bruits [No Abdominal Bruit] : a ~M bruit was not heard ~T in the abdomen [No Varicosities] : no varicosities [Pedal Pulses Present] : the pedal pulses are present [No Edema] : there was no peripheral edema [No Palpable Aorta] : no palpable aorta [No Extremity Clubbing/Cyanosis] : no extremity clubbing/cyanosis [Soft] : abdomen soft [Non Tender] : non-tender [Non-distended] : non-distended [No Masses] : no abdominal mass palpated [No HSM] : no HSM [Normal Bowel Sounds] : normal bowel sounds [Normal Posterior Cervical Nodes] : no posterior cervical lymphadenopathy [Normal Anterior Cervical Nodes] : no anterior cervical lymphadenopathy [No CVA Tenderness] : no CVA  tenderness [No Spinal Tenderness] : no spinal tenderness [No Joint Swelling] : no joint swelling [Grossly Normal Strength/Tone] : grossly normal strength/tone [No Rash] : no rash [Coordination Grossly Intact] : coordination grossly intact [No Focal Deficits] : no focal deficits [Normal Gait] : normal gait [Normal Affect] : the affect was normal [Normal Insight/Judgement] : insight and judgment were intact [de-identified] : L knee with some ttp across lateral tendon insertion site of quadriceps muscle; no anterior/posterior drawer; valgus stress test wnl; varus stress test with possible positive finding - subjective sensation of pain although no pop/click/sounds heard by myself; PT/DP 2+, sensation intact; ROM intact at knee, hip, and ankle

## 2024-07-01 NOTE — PLAN
[FreeTextEntry1] :  #pre-DM - A1C 6.1 last year >> A1C today  #HTN - Meds: lisinopril 20 + metoprolol 25mg qd >> continue - EKG negative, no LVH - UA, r/o proteinuria  #Obesity - BMI 40.25 - Weight management referral provided  #L Knee pain - suspect PFS, tendonitis, or meniscus injury - XR L knee, 3 views - Conservative measures for now discussed - PT + ?MRI may consider if sx's persist in future, I reviewed some exercises with patient online (pt and daughter)  #HCM - PHQ/AUDIT/DAST negative - Vaxx UTD - Cancer: UTD - Labs: CBC, CMP, A1C, Lipids, TSH/T4

## 2024-07-24 ENCOUNTER — APPOINTMENT (OUTPATIENT)
Dept: INTERNAL MEDICINE | Facility: CLINIC | Age: 49
End: 2024-07-24

## 2024-08-07 ENCOUNTER — OUTPATIENT (OUTPATIENT)
Dept: OUTPATIENT SERVICES | Facility: HOSPITAL | Age: 49
LOS: 1 days | End: 2024-08-07
Payer: SELF-PAY

## 2024-08-07 ENCOUNTER — APPOINTMENT (OUTPATIENT)
Dept: INTERNAL MEDICINE | Facility: CLINIC | Age: 49
End: 2024-08-07

## 2024-08-07 PROBLEM — R39.9 UTI SYMPTOMS: Status: RESOLVED | Noted: 2023-07-25 | Resolved: 2024-08-07

## 2024-08-07 PROBLEM — R10.13 EPIGASTRIC PAIN: Status: ACTIVE | Noted: 2024-08-07

## 2024-08-07 PROBLEM — M25.562 LEFT KNEE PAIN: Status: RESOLVED | Noted: 2024-06-18 | Resolved: 2024-08-07

## 2024-08-07 PROCEDURE — G0463: CPT

## 2024-08-07 PROCEDURE — 99213 OFFICE O/P EST LOW 20 MIN: CPT | Mod: GE

## 2024-08-08 DIAGNOSIS — I10 ESSENTIAL (PRIMARY) HYPERTENSION: ICD-10-CM

## 2024-08-11 NOTE — PHYSICAL EXAM
[Normal] : normal rate, regular rhythm, normal S1 and S2 and no murmur heard [de-identified] : soft, non distended, tender to palpation in epigastrium and RUQ, no murphys sign

## 2024-08-11 NOTE — HISTORY OF PRESENT ILLNESS
[FreeTextEntry1] : f/u [de-identified] : 49F with PMHx obesity, pre-diabetes, HTN presenting to discuss lab work.  Complaining of subjective fever, pain in her neck about 1 week ago that have resolved. Now having stomach pain x 1 month; when she eats anything greasy she needs to go to the bathroom, has stool that floats in the toilet bowl, cramping pain worse when she eats. No nausea or vomiting, diarrhea. Has hx of gallstones but gallbladder was never removed.  Diet: no red meat, eats vegetables, chicken, fish. Tries to limit fried foods, juices.

## 2024-08-11 NOTE — HISTORY OF PRESENT ILLNESS
[FreeTextEntry1] : f/u [de-identified] : 49F with PMHx obesity, pre-diabetes, HTN presenting to discuss lab work.  Complaining of subjective fever, pain in her neck about 1 week ago that have resolved. Now having stomach pain x 1 month; when she eats anything greasy she needs to go to the bathroom, has stool that floats in the toilet bowl, cramping pain worse when she eats. No nausea or vomiting, diarrhea. Has hx of gallstones but gallbladder was never removed.  Diet: no red meat, eats vegetables, chicken, fish. Tries to limit fried foods, juices.

## 2024-08-11 NOTE — PHYSICAL EXAM
[Normal] : normal rate, regular rhythm, normal S1 and S2 and no murmur heard [de-identified] : soft, non distended, tender to palpation in epigastrium and RUQ, no murphys sign

## 2024-08-11 NOTE — INTERPRETER SERVICES
[Patient Declined  Services] : - None: Patient declined  services [Interpreters_Relationshiptopatient] : Daughter in law

## 2024-08-11 NOTE — PHYSICAL EXAM
[Normal] : normal rate, regular rhythm, normal S1 and S2 and no murmur heard [de-identified] : soft, non distended, tender to palpation in epigastrium and RUQ, no murphys sign

## 2024-08-11 NOTE — ASSESSMENT
[FreeTextEntry1] : 49F with PMHx obesity, pre-DM, HTN presenting for f/u labs and abdominal pain.  #Abdominal pain - Epigastric pain with pain in RUQ, worse with greasy food - Hx of cholelithiasis - Will f/u RUQ ultrasound   #Weight management - Patient tried to make an appointment but was not successful. Gave phone number again and will also try to reach out to appropriate leadership in clinic to ensure patient has an appointment  - Would be a good candidate for GLP-1  Case discussed with Dr. Sy. RTC in 5 weeks for f/u.

## 2024-08-11 NOTE — REVIEW OF SYSTEMS
[Fever] : fever [Abdominal Pain] : abdominal pain [Diarrhea] : diarrhea [Negative] : Neurological [Nausea] : no nausea [Vomiting] : no vomiting

## 2024-08-11 NOTE — HISTORY OF PRESENT ILLNESS
[FreeTextEntry1] : f/u [de-identified] : 49F with PMHx obesity, pre-diabetes, HTN presenting to discuss lab work.  Complaining of subjective fever, pain in her neck about 1 week ago that have resolved. Now having stomach pain x 1 month; when she eats anything greasy she needs to go to the bathroom, has stool that floats in the toilet bowl, cramping pain worse when she eats. No nausea or vomiting, diarrhea. Has hx of gallstones but gallbladder was never removed.  Diet: no red meat, eats vegetables, chicken, fish. Tries to limit fried foods, juices.

## 2024-08-11 NOTE — END OF VISIT
[] : Resident [FreeTextEntry3] : Sending for Abd US.  further care based on results. She knows to go to ED for acute pain

## 2024-08-19 DIAGNOSIS — K80.20 CALCULUS OF GALLBLADDER WITHOUT CHOLECYSTITIS WITHOUT OBSTRUCTION: ICD-10-CM

## 2024-08-19 DIAGNOSIS — E66.01 MORBID (SEVERE) OBESITY DUE TO EXCESS CALORIES: ICD-10-CM

## 2024-10-15 ENCOUNTER — APPOINTMENT (OUTPATIENT)
Dept: INTERNAL MEDICINE | Facility: CLINIC | Age: 49
End: 2024-10-15

## 2025-01-13 ENCOUNTER — APPOINTMENT (OUTPATIENT)
Dept: INTERNAL MEDICINE | Facility: CLINIC | Age: 50
End: 2025-01-13
